# Patient Record
Sex: MALE | Race: BLACK OR AFRICAN AMERICAN | Employment: UNEMPLOYED | URBAN - METROPOLITAN AREA
[De-identification: names, ages, dates, MRNs, and addresses within clinical notes are randomized per-mention and may not be internally consistent; named-entity substitution may affect disease eponyms.]

---

## 2017-07-26 ENCOUNTER — OFFICE VISIT (OUTPATIENT)
Dept: FAMILY MEDICINE CLINIC | Age: 27
End: 2017-07-26

## 2017-07-26 VITALS
WEIGHT: 188.6 LBS | RESPIRATION RATE: 16 BRPM | BODY MASS INDEX: 23.45 KG/M2 | TEMPERATURE: 98.3 F | SYSTOLIC BLOOD PRESSURE: 113 MMHG | OXYGEN SATURATION: 98 % | HEART RATE: 78 BPM | DIASTOLIC BLOOD PRESSURE: 69 MMHG | HEIGHT: 75 IN

## 2017-07-26 DIAGNOSIS — Z79.899 ENCOUNTER FOR LONG-TERM (CURRENT) USE OF HIGH-RISK MEDICATION: ICD-10-CM

## 2017-07-26 DIAGNOSIS — F31.75 BIPOLAR DISORDER, IN PARTIAL REMISSION, MOST RECENT EPISODE DEPRESSED (HCC): ICD-10-CM

## 2017-07-26 DIAGNOSIS — Z76.89 ESTABLISHING CARE WITH NEW DOCTOR, ENCOUNTER FOR: Primary | ICD-10-CM

## 2017-07-26 RX ORDER — DIVALPROEX SODIUM 500 MG/1
500 TABLET, DELAYED RELEASE ORAL 2 TIMES DAILY
Qty: 60 TAB | Refills: 1 | Status: SHIPPED | OUTPATIENT
Start: 2017-07-26 | End: 2017-11-09 | Stop reason: SDUPTHER

## 2017-07-26 RX ORDER — BENZTROPINE MESYLATE 2 MG/1
1 TABLET ORAL DAILY
Qty: 30 TAB | Refills: 1 | Status: SHIPPED | OUTPATIENT
Start: 2017-07-26 | End: 2017-11-09 | Stop reason: SDUPTHER

## 2017-07-26 RX ORDER — HALOPERIDOL 5 MG/1
5 TABLET ORAL 2 TIMES DAILY
Qty: 60 TAB | Refills: 1 | Status: SHIPPED | OUTPATIENT
Start: 2017-07-26 | End: 2017-11-09 | Stop reason: SDUPTHER

## 2017-07-26 NOTE — PROGRESS NOTES
Chief Complaint   Patient presents with   [facility] Establish Care     Establish care. Needs med refills.

## 2017-07-26 NOTE — MR AVS SNAPSHOT
Visit Information Date & Time Provider Department Dept. Phone Encounter #  
 7/26/2017 11:00 AM Dominik Butler MD [facility] at [address] [id number] Follow-up Instructions Return in about 4 weeks (around 8/23/2017) for bipolar, meds. Upcoming Health Maintenance Date Due INFLUENZA AGE 9 TO ADULT 8/1/2017 DTaP/Tdap/Td series (2 - Td) 2/6/2022 Allergies as of 7/26/2017  Review Complete On: 7/26/2017 By: Roderick Lopez LPN No Known Allergies Current Immunizations  Reviewed on 11/4/2015 Name Date Influenza Vaccine 3/22/2013 Influenza Vaccine (Quad) PF 11/4/2015 Influenza Vaccine PF 10/8/2013 PPD 2/6/2012 TB Skin Test (PPD) Intradermal 3/19/2014, 3/25/2013 TDAP Vaccine 2/6/2012 Not reviewed this visit You Were Diagnosed With   
  
 Codes Comments Establishing care with new doctor, encounter for    -  Primary ICD-10-CM: Z71.89 ICD-9-CM: V65.8 Bipolar disorder, in partial remission, most recent episode depressed ([facility]     ICD-10-CM: F31.75 ICD-9-CM: 296.55 Encounter for long-term (current) use of high-risk medication     ICD-10-CM: Z79.899 ICD-9-CM: V58.69 Vitals BP Pulse Temp Resp Height(growth percentile) Weight(growth percentile) 113/69 (BP 1 Location: Right arm, BP Patient Position: Sitting) 78 98.3 °F (36.8 °C) (Oral) 16 6' 3\" (1.905 m) 188 lb 9.6 oz (85.5 kg) SpO2 BMI Smoking Status 98% 23.57 kg/m2 Never Smoker BMI and BSA Data Body Mass Index Body Surface Area  
 23.57 kg/m 2 2.13 m 2 Preferred Pharmacy Pharmacy Name Phone [facility], [address] [phone] Your Updated Medication List  
  
   
This list is accurate as of: 7/26/17 11:40 AM.  Always use your most recent med list.  
  
  
  
  
 benztropine 2 mg tablet Commonly known as:  COGENTIN  
 Take 0.5 Tabs by mouth daily. divalproex  mg tablet Commonly known as:  DEPAKOTE Take 1 Tab by mouth two (2) times a day.  
  
 haloperidol 5 mg tablet Commonly known as:  HALDOL Take 1 Tab by mouth two (2) times a day. 1 tab in AM and 2 tabs at bedtime Prescriptions Sent to Pharmacy Refills  
 divalproex DR (DEPAKOTE) 500 mg tablet 1 Sig: Take 1 Tab by mouth two (2) times a day. Class: Normal  
 Pharmacy: [address] Ph #: [phone] Route: Oral  
 haloperidol (HALDOL) 5 mg tablet 1 Sig: Take 1 Tab by mouth two (2) times a day. 1 tab in AM and 2 tabs at bedtime Class: Normal  
 Pharmacy: [address] Ph #: [phone] Route: Oral  
 benztropine (COGENTIN) 2 mg tablet 1 Sig: Take 0.5 Tabs by mouth daily. Class: Normal  
 Pharmacy: [address] Ph #: [phone] Route: Oral  
  
We Performed the Following CBC W/O DIFF [69781 CPT(R)] METABOLIC PANEL, COMPREHENSIVE [13314 CPT(R)] TSH RFX ON ABNORMAL TO FREE T4 [[id number] Custom] Follow-up Instructions Return in about 4 weeks (around 8/23/2017) for bipolar, meds. Introducing [facility] & HEALTH SERVICES! Roxanna Judge introduces [employer] patient portal. Now you can access parts of your medical record, email your doctor's office, and request medication refills online. 1. In your internet browser, go to [email or web address] 2. Click on the First Time User? Click Here link in the Sign In box. You will see the New Member Sign Up page. 3. Enter your [employer] Access Code exactly as it appears below. You will not need to use this code after youve completed the sign-up process.  If you do not sign up before the expiration date, you must request a new code. · [employer] Access Code: [email or web address] Expires: 10/24/2017 11:40 AM 
 
4. Enter the last four digits of your Social Security Number (xxxx) and Date of Birth (mm/dd/yyyy) as indicated and click Submit. You will be taken to the next sign-up page. 5. Create a [employer] ID. This will be your [employer] login ID and cannot be changed, so think of one that is secure and easy to remember. 6. Create a [employer] password. You can change your password at any time. 7. Enter your Password Reset Question and Answer. This can be used at a later time if you forget your password. 8. Enter your e-mail address. You will receive e-mail notification when new information is available in [address] 9. Click Sign Up. You can now view and download portions of your medical record. 10. Click the Download Summary menu link to download a portable copy of your medical information. If you have questions, please visit the Frequently Asked Questions section of the [employer] website. Remember, [employer] is NOT to be used for urgent needs. For medical emergencies, dial 911. Now available from your iPhone and Android! Please provide this summary of care documentation to your next provider. Your primary care clinician is listed as Phys Other. If you have any questions after today's visit, please call [phone].

## 2017-07-26 NOTE — PROGRESS NOTES
Gerhardt Burlington is a 33 y.o. male, who's a new patient to our practice. Previous PCP: Yue, last seen in 2015. Have moved recently from [facility]. Used to live with his adopted parent but no longer. Lives with his girlfriend and her mother. He has a , not present with him. 32yo AA M with a past medical history of Aggression (3/22/2013); Bipolar affective disorder ([facility] (3/22/2013); Bipolar disorder, in partial remission, most recent episode depressed ([facility] (7/26/2017); Heart murmur (11/23/2015); History of traumatic brain injury (11/23/2015)    Bipolar affective. Have been without his meds for 11 months now. His symptoms currently are mild mood swings. He was advice by his new GF family to sort out his medication issue. Hence being here for meds refill. He denies anxiety or depression. Denies Visual hallucination or auditory hallucination, SI or HI. He was previously on depakote, cogentin, haldo. We discussed just starting on depakote alone first and f/u for eval and see if there's need to restart on the rest.   Also we'll get medical record from [facility]. Reviewed: active problem list, medication list, allergies, family history, social history, notes from last encounter    A comprehensive review of systems was negative except for that written in the HPI. No Known Allergies  No current outpatient prescriptions on file prior to visit. No current facility-administered medications on file prior to visit.       Patient Active Problem List   Diagnosis Code    Encounter for PPD test Z11.1    Knee pain, left M25.562    Bipolar affective disorder ([facility] F31.9    Aggression R45.89    Heart murmur R01.1    History of traumatic brain injury Z87.820    Bipolar disorder, in partial remission, most [facility] episode [facility] F31.75       Visit Vitals    /69 (BP 1 Location: Right arm, BP Patient Position: Sitting)    Pulse 78    Temp 98.3 °F (36.8 °C) (Oral)    Resp 16    Ht 6' 3\" (1.905 m)    Wt 188 lb 9.6 oz (85.5 kg)    SpO2 98%    BMI 23.57 kg/m2     General appearance: alert, cooperative, no distress, appears stated age  Neurologic: Alert and oriented X 3, normal strength and tone, symmetric. Normal without focal findings. Cranial nerves 2-12 intact. Normal coordination and gait. Mental status: Alert, oriented, thought content appropriate, affect: stable, mood-congruent. Head: Normocephalic, without obvious abnormality, atraumatic  Eyes: conjunctivae/corneas clear. PERRL, EOM's intact. Neck: supple, symmetrical, trachea midline, no JVD  Lungs: clear to auscultation bilaterally  Heart: regular rate and rhythm, S1, S2 normal, no murmur, click, rub or gallop  Abdomen: soft, non-tender. Extremities: extremities normal, atraumatic, no cyanosis or edema      Assessment/Plans:    He was previously on depakote, cogentin, haldo. We discussed just starting on depakote alone first and f/u for eval and see if there's need to restart on the rest.   Also we'll get medical record from [facility]. Diagnoses and all orders for this visit:    1. Establishing care with new doctor, encounter for    2. Bipolar disorder, in partial remission, most recent episode depressed (HCC)  -     divalproex DR (DEPAKOTE) 500 mg tablet; Take 1 Tab by mouth two (2) times a day. -     haloperidol (HALDOL) 5 mg tablet; Take 1 Tab by mouth two (2) times a day. 1 tab in AM and 2 tabs at bedtime  -     benztropine (COGENTIN) 2 mg tablet; Take 0.5 Tabs by mouth daily.  -     CBC W/O DIFF  -     METABOLIC PANEL, COMPREHENSIVE  -     TSH RFX ON ABNORMAL TO FREE T4    3. Encounter for long-term (current) use of high-risk medication  -     CBC W/O DIFF  -     METABOLIC PANEL, COMPREHENSIVE  -     TSH RFX ON ABNORMAL TO FREE T4      Discussed plans, risk/benefits of treatments/observations. Through the use of shared decision making, above plans were agreed upon.    Medication compliance advised. Patient verbalized understanding. Follow-up Disposition:  Return in about 4 weeks (around 8/23/2017) for bipolar, meds.       Georgette Velazquez MD  7/26/2017

## 2017-07-27 LAB
ALBUMIN SERPL-MCNC: 4.8 G/DL (ref 3.5–5.5)
ALBUMIN/GLOB SERPL: 1.8 {RATIO} (ref 1.2–2.2)
ALP SERPL-CCNC: 61 IU/L (ref 39–117)
ALT SERPL-CCNC: 19 IU/L (ref 0–44)
AST SERPL-CCNC: 26 IU/L (ref 0–40)
BILIRUB SERPL-MCNC: 0.4 MG/DL (ref 0–1.2)
BUN SERPL-MCNC: 9 MG/DL (ref 6–20)
BUN/CREAT SERPL: 8 (ref 9–20)
CALCIUM SERPL-MCNC: 10.2 MG/DL (ref 8.7–10.2)
CHLORIDE SERPL-SCNC: 99 MMOL/L (ref 96–106)
CO2 SERPL-SCNC: 26 MMOL/L (ref 18–29)
CREAT SERPL-MCNC: 1.07 MG/DL (ref 0.76–1.27)
ERYTHROCYTE [DISTWIDTH] IN BLOOD BY AUTOMATED COUNT: 14.3 % (ref 12.3–15.4)
GLOBULIN SER CALC-MCNC: 2.6 G/DL (ref 1.5–4.5)
GLUCOSE SERPL-MCNC: 83 MG/DL (ref 65–99)
HCT VFR BLD AUTO: 47.7 % (ref 37.5–51)
HGB BLD-MCNC: 16 G/DL (ref 12.6–17.7)
MCH RBC QN AUTO: 28.6 PG (ref 26.6–33)
MCHC RBC AUTO-ENTMCNC: 33.5 G/DL (ref 31.5–35.7)
MCV RBC AUTO: 85 FL (ref 79–97)
PLATELET # BLD AUTO: 281 X10E3/UL (ref 150–379)
POTASSIUM SERPL-SCNC: 4.2 MMOL/L (ref 3.5–5.2)
PROT SERPL-MCNC: 7.4 G/DL (ref 6–8.5)
RBC # BLD AUTO: 5.6 X10E6/UL (ref 4.14–5.8)
SODIUM SERPL-SCNC: 140 MMOL/L (ref 134–144)
TSH SERPL DL<=0.005 MIU/L-ACNC: 1.66 UIU/ML (ref 0.45–4.5)
WBC # BLD AUTO: 6.6 X10E3/UL (ref 3.4–10.8)

## 2017-11-09 ENCOUNTER — OFFICE VISIT (OUTPATIENT)
Dept: FAMILY MEDICINE CLINIC | Age: 27
End: 2017-11-09

## 2017-11-09 VITALS
OXYGEN SATURATION: 96 % | RESPIRATION RATE: 16 BRPM | HEIGHT: 75 IN | BODY MASS INDEX: 24.89 KG/M2 | HEART RATE: 62 BPM | WEIGHT: 200.2 LBS | SYSTOLIC BLOOD PRESSURE: 138 MMHG | TEMPERATURE: 97.5 F | DIASTOLIC BLOOD PRESSURE: 81 MMHG

## 2017-11-09 DIAGNOSIS — Z23 ENCOUNTER FOR IMMUNIZATION: ICD-10-CM

## 2017-11-09 DIAGNOSIS — Z00.00 ANNUAL PHYSICAL EXAM: Primary | ICD-10-CM

## 2017-11-09 DIAGNOSIS — F31.75 BIPOLAR DISORDER, IN PARTIAL REMISSION, MOST RECENT EPISODE DEPRESSED (HCC): ICD-10-CM

## 2017-11-09 DIAGNOSIS — Z79.899 ENCOUNTER FOR LONG-TERM CURRENT USE OF HIGH RISK MEDICATION: ICD-10-CM

## 2017-11-09 RX ORDER — HALOPERIDOL 5 MG/1
5 TABLET ORAL 2 TIMES DAILY
Qty: 60 TAB | Refills: 2 | Status: SHIPPED | OUTPATIENT
Start: 2017-11-09 | End: 2019-01-13

## 2017-11-09 RX ORDER — BENZTROPINE MESYLATE 2 MG/1
1 TABLET ORAL DAILY
Qty: 30 TAB | Refills: 2 | Status: SHIPPED | OUTPATIENT
Start: 2017-11-09 | End: 2019-01-13

## 2017-11-09 RX ORDER — DIVALPROEX SODIUM 250 MG/1
250 TABLET, DELAYED RELEASE ORAL 2 TIMES DAILY
Qty: 60 TAB | Refills: 2 | Status: SHIPPED | OUTPATIENT
Start: 2017-11-09 | End: 2019-01-13

## 2017-11-09 NOTE — PROGRESS NOTES
Chief Complaint   Patient presents with    Medication Management     Patient her for medication management. Patient concerned how drowsy the Depakote makes him feel. Patient states he used to take Haldol injection in addition to oral medication. Patient concerned if he's on correct regimen.

## 2017-11-09 NOTE — PROGRESS NOTES
Venus Waller is a 33 y.o. male,     2nd visit with this physician. He didn't f/u as discussed after last visit. It's now 4 months later. Used to live with his adopted parent but no longer. Lives with his girlfriend and her mother. He has a , not present with him. 32yo AA M with a past medical history of Aggression (3/22/2013); Bipolar affective disorder ([facility] (3/22/2013); Bipolar disorder, in partial remission, most recent episode depressed ([facility] (7/26/2017); Heart murmur (11/23/2015); History of traumatic brain injury (11/23/2015)    Bipolar affective. We have restarted his meds for 4 months now. He report currently doing well. His GF and her mother has no other concern at this time. He denies anxiety or depression. Denies Visual hallucination or auditory hallucination, SI or HI. He is on depakote, cogentin, haldo. The depakote at 500mg BID makes him very sleepy, he would fall a sleep for about 2 hours in the day after taking it. He have stop taking that in the morning and only takes that at night. We discussed option, we can try to decrease dose to 250mg BID. If still cause drowsiness it's OK to take QHS. We'll also refer him to Psych. Otherwise denies CP, SOB, [place], abnormal tic, or mood or psychotic episodes. Reviewed: active problem list, medication list, allergies, family history, social history, notes from last encounter    A comprehensive review of systems was negative except for that written in the HPI. No Known Allergies  No current outpatient prescriptions on file prior to visit. No current facility-administered medications on file prior to visit.       Patient Active Problem List   Diagnosis Code    Encounter for PPD test Z11.1    Knee pain, left M25.562    Bipolar affective disorder ([facility] F31.9    Aggression R45.89    Heart murmur R01.1    History of traumatic brain injury Z87.820    Bipolar disorder, in partial remission, most recent episode depressed ([facility] F31.75       Visit Vitals    /81 (BP 1 Location: Right arm, BP Patient Position: Sitting)    Pulse 62    Temp 97.5 °F (36.4 °C) (Oral)    Resp 16    Ht 6' 3\" (1.905 m)    Wt 200 lb 3.2 oz (90.8 kg)    SpO2 96%    BMI 25.02 kg/m2     General appearance: alert, cooperative, no distress, appears stated age  Neurologic: Alert and oriented X 3, normal strength and tone, symmetric. Normal without focal findings. Cranial nerves 2-12 intact. Normal coordination and gait. Mental status: Alert, oriented, thought content appropriate, affect: stable, mood-congruent. Head: Normocephalic, without obvious abnormality, atraumatic  Eyes: conjunctivae/corneas clear. PERRL, EOM's intact. Neck: supple, symmetrical, trachea midline, no JVD  Lungs: clear to auscultation bilaterally  Heart: regular rate and rhythm, S1, S2 normal, no murmur, click, rub or gallop  Abdomen: soft, non-tender. Extremities: extremities normal, atraumatic, no cyanosis or edema      Assessment/Plans:    Diagnoses and all orders for this visit:    1. Annual physical exam  -     CBC W/O DIFF  -     HEMOGLOBIN A1C W/O EAG  -     LIPID PANEL  -     METABOLIC PANEL, COMPREHENSIVE  -     TSH RFX ON ABNORMAL TO FREE T4  -     URINALYSIS W/ RFLX MICROSCOPIC  -     VITAMIN D, 25 HYDROXY  -     HIV 1/2 AG/AB, 4TH GENERATION,W RFLX CONFIRM    2. Bipolar disorder, in partial remission, most recent episode depressed ([facility]  -     [facility]  -     divalproex DR (DEPAKOTE) 250 mg tablet; Take 1 Tab by mouth two (2) times a day. -     haloperidol (HALDOL) 5 mg tablet; Take 1 Tab by mouth two (2) times a day. 1 tab in AM and 2 tabs at bedtime  -     benztropine (COGENTIN) 2 mg tablet; Take 0.5 Tabs by mouth daily.  -     CBC W/O DIFF  -     METABOLIC PANEL, COMPREHENSIVE    3.  Encounter for long-term current use of high risk medication  -     CBC W/O DIFF  -     HEMOGLOBIN A1C W/O EAG  -     LIPID PANEL  -     METABOLIC PANEL, COMPREHENSIVE  -     TSH RFX ON ABNORMAL TO FREE T4  -     URINALYSIS W/ RFLX MICROSCOPIC  -     VITAMIN D, 25 HYDROXY  -     HIV 1/2 AG/AB, 4TH GENERATION,W RFLX CONFIRM    4. Encounter for immunization  -     TX IMMUNIZ ADMIN,1 SINGLE/COMB VAC/TOXOID  -     Influenza virus vaccine (QUADRIVALENT PRES FREE SYRINGE) IM (17030)      Discussed plans, risk/benefits of treatments/observations. Through the use of shared decision making, above plans were agreed upon. Medication compliance advised. Patient verbalized understanding.      Follow-up Disposition:  Return in about 3 months (around 2/9/2018) for meds, labs monitoring, sooner if labs abnormal.      Martha Pantoja MD  11/9/2017

## 2017-11-09 NOTE — MR AVS SNAPSHOT
Visit Information Date & Time Provider Department Dept. Phone Encounter #  
 11/9/2017 10:00 AM Lobo Hansen MD [facility] at [address] [id number] Follow-up Instructions Return in about 3 months (around 2/9/2018) for meds, labs monitoring, sooner if labs abnormal. Upcoming Health Maintenance Date Due Influenza Age 5 to Adult 8/1/2017 DTaP/Tdap/Td series (2 - Td) 2/6/2022 Allergies as of 11/9/2017  Review Complete On: 11/9/2017 By: Lobo Hansen MD  
 No Known Allergies Current Immunizations  Reviewed on 11/4/2015 Name Date Influenza Vaccine 3/22/2013 Influenza Vaccine (Quad) PF  Incomplete, 11/4/2015 Influenza Vaccine PF 10/8/2013 PPD 2/6/2012 TB Skin Test (PPD) Intradermal 3/19/2014, 3/25/2013 TDAP Vaccine 2/6/2012 Not reviewed this visit You Were Diagnosed With   
  
 Codes Comments Annual physical exam    -  Primary ICD-10-CM: Z00.00 ICD-9-CM: V70.0 Bipolar disorder, in partial remission, most recent episode depressed ([facility]     ICD-10-CM: F31.75 ICD-9-CM: 296.55 Encounter for long-term current use of high risk medication     ICD-10-CM: Z79.899 ICD-9-CM: V58.69 Encounter for immunization     ICD-10-CM: P48 ICD-9-CM: V03.89 Vitals BP Pulse Temp Resp Height(growth percentile) Weight(growth percentile) 138/81 (BP 1 Location: Right arm, BP Patient Position: Sitting) 62 97.5 °F (36.4 °C) (Oral) 16 6' 3\" (1.905 m) 200 lb 3.2 oz (90.8 kg) SpO2 BMI Smoking Status 96% 25.02 kg/m2 Never Smoker Vitals History BMI and BSA Data Body Mass Index Body Surface Area 25.02 kg/m 2 2.19 m 2 Preferred Pharmacy Pharmacy Name Phone [address], [address] [phone] Your Updated Medication List  
  
   
This list is accurate as of: 11/9/17 10:46 AM.  Always use your most recent med list.  
  
  
  
  
 benztropine 2 mg tablet Commonly known as:  COGENTIN Take 0.5 Tabs by mouth daily. divalproex  mg tablet Commonly known as:  DEPAKOTE Take 1 Tab by mouth two (2) times a day.  
  
 haloperidol 5 mg tablet Commonly known as:  HALDOL Take 1 Tab by mouth two (2) times a day. 1 tab in AM and 2 tabs at bedtime Prescriptions Sent to Pharmacy Refills  
 divalproex DR (DEPAKOTE) 250 mg tablet 2 Sig: Take 1 Tab by mouth two (2) times a day. Class: Normal  
 Pharmacy: [address] Ph #: [phone] Route: Oral  
 haloperidol (HALDOL) 5 mg tablet 2 Sig: Take 1 Tab by mouth two (2) times a day. 1 tab in AM and 2 tabs at bedtime Class: Normal  
 Pharmacy: [address] Ph #: [phone] Route: Oral  
 benztropine (COGENTIN) 2 mg tablet 2 Sig: Take 0.5 Tabs by mouth daily. Class: Normal  
 Pharmacy: [address] Ph #: [phone] Route: Oral  
  
We Performed the Following CBC W/O DIFF [97129 CPT(R)] HEMOGLOBIN A1C W/O EAG [94137 CPT(R)] HIV 1/2 AG/AB, 4TH GENERATION,W RFLX CONFIRM [id number] CPT(R)] INFLUENZA VIRUS VAC QUAD,SPLIT,PRESV FREE SYRINGE IM [id number] CPT(R)] LIPID PANEL [27206 CPT(R)] METABOLIC PANEL, COMPREHENSIVE [61136 CPT(R)] DE IMMUNIZ ADMIN,1 SINGLE/COMB VAC/TOXOID [id number] CPT(R)] REFERRAL TO PSYCHIATRY [REF91 Custom] Comments:  
 bipolar TSH RFX ON ABNORMAL TO FREE T4 [[id number] Custom] URINALYSIS W/ RFLX MICROSCOPIC [54039 CPT(R)] VITAMIN D, 25 HYDROXY [id number] CPT(R)] Follow-up Instructions  Return in about 3 months (around 2/9/2018) for meds, labs monitoring, sooner if labs abnormal.  
  
 Referral Information Referral ID Referred By Referred To  
  
 [id number] Jolly Goodson MD   
   [facility] [address] [place], [address] [facility] Phone: [phone] Fax: [phone] Visits Status Start Date End Date 1 New Request 11/9/17 11/9/18 If your referral has a status of pending review or denied, additional information will be sent to support the outcome of this decision. Introducing [facility] & [facility]! Adal Aguilera introduces [employer] patient portal. Now you can access parts of your medical record, email your doctor's office, and request medication refills online. 1. In your internet browser, go to [email or web address] 2. Click on the First Time User? Click Here link in the Sign In box. You will see the New Member Sign Up page. 3. Enter your [employer] Access Code exactly as it appears below. You will not need to use this code after youve completed the sign-up process. If you do not sign up before the expiration date, you must request a new code. · [employer] Access Code: [email or web address] Expires: 2/7/2018 10:46 AM 
 
4. Enter the last four digits of your Social Security Number (xxxx) and Date of Birth (mm/dd/yyyy) as indicated and click Submit. You will be taken to the next sign-up page. 5. Create a [employer] ID. This will be your [employer] login ID and cannot be changed, so think of one that is secure and easy to remember. 6. Create a [employer] password. You can change your password at any time. 7. Enter your Password Reset Question and Answer. This can be used at a later time if you forget your password. 8. Enter your e-mail address. You will receive e-mail notification when new information is available in [address] 9. Click Sign Up. You can now view and download portions of your medical record. 10. Click the Download Summary menu link to download a portable copy of your medical information. If you have questions, please visit the Frequently Asked Questions section of the [employer] website. Remember, [employer] is NOT to be used for urgent needs. For medical emergencies, dial 911. Now available from your iPhone and Android! Please provide this summary of care documentation to your next provider. Your primary care clinician is listed as Phys Other. If you have any questions after today's visit, please call [phone].

## 2017-11-10 LAB
25(OH)D3+25(OH)D2 SERPL-MCNC: 18.3 NG/ML (ref 30–100)
ALBUMIN SERPL-MCNC: 4.2 G/DL (ref 3.5–5.5)
ALBUMIN/GLOB SERPL: 1.8 {RATIO} (ref 1.2–2.2)
ALP SERPL-CCNC: 66 IU/L (ref 39–117)
ALT SERPL-CCNC: 36 IU/L (ref 0–44)
APPEARANCE UR: CLEAR
AST SERPL-CCNC: 21 IU/L (ref 0–40)
BACTERIA #/AREA URNS HPF: ABNORMAL /[HPF]
BILIRUB SERPL-MCNC: 0.7 MG/DL (ref 0–1.2)
BILIRUB UR QL STRIP: NEGATIVE
BUN SERPL-MCNC: 10 MG/DL (ref 6–20)
BUN/CREAT SERPL: 10 (ref 9–20)
CALCIUM SERPL-MCNC: 9.3 MG/DL (ref 8.7–10.2)
CASTS URNS QL MICRO: ABNORMAL /LPF
CHLORIDE SERPL-SCNC: 99 MMOL/L (ref 96–106)
CHOLEST SERPL-MCNC: 152 MG/DL (ref 100–199)
CO2 SERPL-SCNC: 25 MMOL/L (ref 18–29)
COLOR UR: YELLOW
CREAT SERPL-MCNC: 1.02 MG/DL (ref 0.76–1.27)
EPI CELLS #/AREA URNS HPF: ABNORMAL /HPF
ERYTHROCYTE [DISTWIDTH] IN BLOOD BY AUTOMATED COUNT: 13.6 % (ref 12.3–15.4)
GFR SERPLBLD CREATININE-BSD FMLA CKD-EPI: 101 ML/MIN/1.73
GFR SERPLBLD CREATININE-BSD FMLA CKD-EPI: 117 ML/MIN/1.73
GLOBULIN SER CALC-MCNC: 2.3 G/DL (ref 1.5–4.5)
GLUCOSE SERPL-MCNC: 90 MG/DL (ref 65–99)
GLUCOSE UR QL: NEGATIVE
HBA1C MFR BLD: 5.1 % (ref 4.8–5.6)
HCT VFR BLD AUTO: 47.1 % (ref 37.5–51)
HDLC SERPL-MCNC: 60 MG/DL
HGB BLD-MCNC: 16.1 G/DL (ref 12.6–17.7)
HGB UR QL STRIP: NEGATIVE
HIV 1+2 AB+HIV1 P24 AG SERPL QL IA: NON REACTIVE
KETONES UR QL STRIP: NEGATIVE
LDLC SERPL CALC-MCNC: 72 MG/DL (ref 0–99)
LEUKOCYTE ESTERASE UR QL STRIP: ABNORMAL
MCH RBC QN AUTO: 29.9 PG (ref 26.6–33)
MCHC RBC AUTO-ENTMCNC: 34.2 G/DL (ref 31.5–35.7)
MCV RBC AUTO: 88 FL (ref 79–97)
MICRO URNS: ABNORMAL
MUCOUS THREADS URNS QL MICRO: PRESENT
NITRITE UR QL STRIP: NEGATIVE
PH UR STRIP: 6.5 [PH] (ref 5–7.5)
PLATELET # BLD AUTO: 264 X10E3/UL (ref 150–379)
POTASSIUM SERPL-SCNC: 4.1 MMOL/L (ref 3.5–5.2)
PROT SERPL-MCNC: 6.5 G/DL (ref 6–8.5)
PROT UR QL STRIP: NEGATIVE
RBC # BLD AUTO: 5.38 X10E6/UL (ref 4.14–5.8)
RBC #/AREA URNS HPF: ABNORMAL /HPF
SODIUM SERPL-SCNC: 141 MMOL/L (ref 134–144)
SP GR UR: 1.02 (ref 1–1.03)
TRIGL SERPL-MCNC: 101 MG/DL (ref 0–149)
TSH SERPL DL<=0.005 MIU/L-ACNC: 1.13 UIU/ML (ref 0.45–4.5)
UROBILINOGEN UR STRIP-MCNC: 0.2 MG/DL (ref 0.2–1)
VLDLC SERPL CALC-MCNC: 20 MG/DL (ref 5–40)
WBC # BLD AUTO: 6.9 X10E3/UL (ref 3.4–10.8)
WBC #/AREA URNS HPF: ABNORMAL /HPF

## 2017-11-20 DIAGNOSIS — E55.9 VITAMIN D DEFICIENCY: Primary | ICD-10-CM

## 2017-11-20 RX ORDER — ERGOCALCIFEROL 1.25 MG/1
50000 CAPSULE ORAL
Qty: 12 CAP | Refills: 1 | Status: SHIPPED | OUTPATIENT
Start: 2017-11-20 | End: 2021-04-23

## 2018-05-09 ENCOUNTER — HOSPITAL ENCOUNTER (EMERGENCY)
Age: 28
Discharge: HOME | End: 2018-05-09
Attending: EMERGENCY MEDICINE
Payer: COMMERCIAL

## 2018-05-09 DIAGNOSIS — F31.9 BIPOLAR AFFECTIVE DISORDER, REMISSION STATUS UNSPECIFIED (HCC): Primary | ICD-10-CM

## 2018-05-09 PROCEDURE — 99283 EMERGENCY DEPT VISIT LOW MDM: CPT

## 2018-05-09 PROCEDURE — 90791 PSYCH DIAGNOSTIC EVALUATION: CPT

## 2018-05-09 NOTE — ED PROVIDER NOTES
HPI     Past Medical History:   Diagnosis Date    Aggression 3/22/2013    Bipolar affective disorder ([facility] 3/22/2013    Bipolar disorder, in partial remission, most recent episode depressed ([facility] 7/26/2017    Heart murmur 11/23/2015    History of traumatic brain injury 11/23/2015    Ill-defined condition     2012 assisted living     Psychiatric disorder     anxiety,bipolar    Psychotic disorder     Trauma     head injury as child    Vitamin D deficiency 11/20/2017       Past Surgical History:   Procedure Laterality Date    MA COLONOSCOPY FLX DX W/COLLJ SPEC WHEN PFRMD  6/13/2014              No family history on file. Social History     Social History    Marital status: SINGLE     Spouse name: N/A    Number of children: 0    Years of education: N/A     Occupational History          day program, light work      Social History Main Topics    Smoking status: Never Smoker    Smokeless tobacco: Never Used    Alcohol use No    Drug use: No      Comment: denies     Sexual activity: Yes     Partners: Female     Birth control/ protection: Condom     Other Topics Concern    Back Care Yes    Exercise Yes    Seat Belt Yes     Social History Narrative         ALLERGIES: Review of patient's allergies indicates no known allergies. Review of Systems    There were no vitals filed for this visit. Physical Exam     MDM  Number of Diagnoses or Management Options        ED Course       Procedures               32 yo AAM with medical hx remarkable for bipolar disorder and ADHD presenting via EMS with complaint of I feel exhausted. Reports residing with girlfriend and tonight got into a verbal disagreement and left and has been walking around [place] for past 2 hrs. Has family in [place]. Reports being prescribed Depakote but has not been taking. Denies SI, HI, auditory or visual hallucinations. Prior hx of inpatient psychiatric hospitalization about 1 year ago per patient.  Denies any substance abuse. No fever, headache, chest pain, SOB, abdominal pain, nausea, vomiting, diarrhea, constipation or urinary complaint. Requesting some help with how to cope. ROS  Const - Fever, chills, diaphoresis  HEENT- visual disturbance, sneezing, sore throat, ear pain, runny nose  card - CP,   Pulm -SOB, wheezing  Abd- abdominal pain, nausea, vomiting, diarrhea, constipation   -urinary frequency, hematuria, urinary retention  Psych- anxiety, - depression, -insomnia, - HI, -SI  Vital signs  Temp 98.3 F, /83, Pulse 88, Resp 16 SpO2 97%  WDWN AAM in NAD  Head: NC/AT  ENT: external ear nml, canal clear,  normal, normal nose, oropharynx clear without erythema or edema, midline uvula, good dentition  Card RRR without MRG  Lungs CTA throughout without adventitious sound  Abdomen: normal appearing, soft, non-tender, no palpable organomegaly or mass  MSK: moving all extremities  Neuro: A and O x 4, CN grossly intact  Psych: Tangential speech with odd affect, no SI or HI  32 yo AAm with hx of bipolar disorder presenting for evaluation. Appears slightly manic but stable. Admitted non-compliance with medication  Plan  [employer] consult  Progress note  Pt seen and evaluated by Song Mcknight, [facility] counselor. Pt provided with resources. Has follow-up in community. Mary Gomezma    A/P  Bipolar disorder: Please follow-up with resources given. Return for any new or worsening.  Niesha Tompkins PA-C

## 2018-05-09 NOTE — ED NOTES
The documentation for this period is being entered following the guidelines as defined in the [facility] downtime policy by Esthela Mortensen.

## 2018-05-09 NOTE — BSMART NOTE
Comprehensive Assessment Form Part 1      Section I - Disposition    Axis I - Relational problem with girlfriend VS Substance Induce mood d/o (noncompliant with medications)      Bipolar disorders bipolar I most recent episode manic noncompliance with treatment by hx  Axis II - diagnosis deferred  Axis III - None  Axis IV - Problems with primary support group, problems related to the social environment, occupational problems, and housing problems  [place] V - 60      The Medical Doctor to Psychiatrist conference was not completed. Medical doctor is in agreement with psychiatrist disposition because this counselor conveyed to ED physician the recommendation of the on-call psychiatrist and they concurred. Plan is to discharge home and follow up with [facility]/Ms. Marvin tomorrow. The on-call Psychiatrist consulted was Dr. Jaqui Abdalla. The admitting Psychiatrist will be Dr. Ben Avelar. The admitting Diagnosis is n/a. The Payor source is [address] [place] [address] Section II - Integrated Summary  Summary:    Patient is a 34 yo black male who arrives at ED with chief complaint of I got into an argument with my girlfriend and got upset. She broke my cell phone. I thought I would come here to settle down.    Patient currently lives with girlfriend and her mother. Patient adamantly denies suicidal ideation, denies homicidal ideation, denies auditory and visual hallucinations, is not delusional, and is oriented X4. Patient reports no previous suicide attempts saying, I love my life to do anything like that.  He is followed by [facility] with Ms. Laney Rollins as his . Patient receives SSI/disability but states he is in the process of looking for a job so he can move out of girlfriends house. 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 Patient also reports he stopped taking his Depakote about 1 month ago and feels this may be exacerbating his anxiety.  Patient reports he was abandoned as a child and went into foster care and then was adopted. He is forward thinking as evidenced by statements above and spent 4 hours today playing basketball.   He tried to contact his grandmother/Alexa Austin to see if he could stay with her tonight but was unable to reach her. He does not mind [facility] and following up with his /Ms Wong at [facility] [place] in the morning. Plan is to discharge home and follow up with [facility]/Ms. Wogn tomorrow. The patient has demonstrated mental capacity to provide informed consent. The information is given by the patient and past medical records. The Chief Complaint is argument with girlfriend. The Precipitant Factors are argument with girlfriend and noncompliance with Rx meds. Previous Hospitalizations: 4-5 at [facility] as a child for ADHD and Bipolar  The patient has not previously been in restraints. Current Psychiatrist and/or  is [facility] [place] [place]. Lethality Assessment:    The potential for suicide noted by the following: not noted. The potential for homicide is not noted. The patient has not been a perpetrator of sexual or physical abuse. There are not pending charges. The patient is not felt to be at risk for self harm or harm to others. The attending nurse was advised no further monitoring is necessary at this time. Section III - Psychosocial  The patient's overall mood and attitude is frustrated. Feelings of helplessness and hopelessness are not observed. Generalized anxiety is not observed. Panic is not observed. Phobias are not observed. Obsessive compulsive tendencies are not observed. Section IV - Mental Status Exam  The patient's appearance shows no evidence of impairment. The patient's behavior shows no evidence of impairment. The patient is oriented to time, place, person and situation. The patient's speech shows no evidence of impairment. The patient's mood is frustrated.   The range of affect shows no evidence of impairment. The patient's thought content demonstrates no evidence of impairment. The thought process shows no evidence of impairment. The patient's perception shows no evidence of impairment. The patient's memory shows no evidence of impairment. The patient's appetite shows no evidence of impairment. The patient's sleep shows no evidence of impairment. The patient's insight is blaming. The patient's judgement shows no evidence of impairment. Section V - Substance Abuse  The patient is not using substances. Section VI - Living Arrangements  The patient is single. The patient lives with a significant other. The patient has no children. The patient does plan to return home upon discharge. The patient does not have legal issues pending. The patient's source of income comes from disability and social security. Druze and cultural practices have not been voiced at this time. The patient's greatest support comes from [facility] and this person will be involved with the treatment. The patient has not been in an event described as horrible or outside the realm of ordinary life experience either currently or in the past.  The patient has not been a victim of sexual/physical abuse. Section VII - Other Areas of Clinical Concern  The highest grade achieved is 12th/tech school with the overall quality of school experience being described as good. The patient is currently disabled and speaks [place] as a primary language. The patient has no communication impairments affecting communication. The patient's preference for learning can be described as: learns best by oral information.   The patient's hearing is normal.  The patient's vision is normal.      Fatuma Zelaya, LPC

## 2019-01-13 ENCOUNTER — HOSPITAL ENCOUNTER (EMERGENCY)
Age: 29
Discharge: HOME | End: 2019-01-13
Attending: STUDENT IN AN ORGANIZED HEALTH CARE EDUCATION/TRAINING PROGRAM
Payer: COMMERCIAL

## 2019-01-13 VITALS
WEIGHT: 202.16 LBS | SYSTOLIC BLOOD PRESSURE: 145 MMHG | BODY MASS INDEX: 24.62 KG/M2 | HEART RATE: 84 BPM | HEIGHT: 76 IN | DIASTOLIC BLOOD PRESSURE: 76 MMHG | TEMPERATURE: 98 F | RESPIRATION RATE: 18 BRPM | OXYGEN SATURATION: 99 %

## 2019-01-13 DIAGNOSIS — Z20.2 POTENTIAL EXPOSURE TO STD: Primary | ICD-10-CM

## 2019-01-13 PROCEDURE — 74011250637 HC RX REV CODE- 250/637: Performed by: STUDENT IN AN ORGANIZED HEALTH CARE EDUCATION/TRAINING PROGRAM

## 2019-01-13 PROCEDURE — 74011250636 HC RX REV CODE- 250/636: Performed by: STUDENT IN AN ORGANIZED HEALTH CARE EDUCATION/TRAINING PROGRAM

## 2019-01-13 PROCEDURE — 87491 CHLMYD TRACH DNA AMP PROBE: CPT

## 2019-01-13 PROCEDURE — 99284 EMERGENCY DEPT VISIT MOD MDM: CPT

## 2019-01-13 PROCEDURE — 96372 THER/PROPH/DIAG INJ SC/IM: CPT

## 2019-01-13 RX ORDER — BENZTROPINE MESYLATE 0.5 MG/1
0.5 TABLET ORAL 2 TIMES DAILY
COMMUNITY
End: 2021-04-23

## 2019-01-13 RX ORDER — DIVALPROEX SODIUM 250 MG/1
250 TABLET, DELAYED RELEASE ORAL 2 TIMES DAILY
COMMUNITY
End: 2021-04-23

## 2019-01-13 RX ORDER — AZITHROMYCIN 250 MG/1
1000 TABLET, FILM COATED ORAL
Status: COMPLETED | OUTPATIENT
Start: 2019-01-13 | End: 2019-01-13

## 2019-01-13 RX ADMIN — LIDOCAINE HYDROCHLORIDE 250 MG: 10 INJECTION, SOLUTION EPIDURAL; INFILTRATION; INTRACAUDAL; PERINEURAL at 12:53

## 2019-01-13 RX ADMIN — AZITHROMYCIN 1000 MG: 250 TABLET, FILM COATED ORAL at 12:53

## 2019-01-13 NOTE — ED TRIAGE NOTES
Triage Note: Patient arrives by EMS for an STD check. Denies urinary symptoms. Patient reports a \"bump\" to left groin.

## 2019-01-13 NOTE — ED PROVIDER NOTES
34 yo M with PMH of bipolar d/o presents by EMS to [facility] with a CC of \"I'm worried I caught an STD. \"  Pt had unprotected intercourse with a new partner recently and two days ago noticed clear drainage from his penis with mild \"tingling\" with urination. He denies ulcer or sore. No hematuria. No PMH of HIV. Requesting testing and empiric treatment. The history is provided by the patient. Other This is a new problem. The current episode started 2 days ago. The problem occurs constantly. The problem has not changed since onset. Pertinent negatives include no abdominal pain. Exacerbated by: washing groin. Nothing relieves the symptoms. Treatments tried: washing groin. The treatment provided no relief. Past Medical History:  
Diagnosis Date  Aggression 3/22/2013  Bipolar affective disorder ([facility] 3/22/2013  Bipolar disorder, in partial remission, most recent episode depressed ([facility] 7/26/2017  Heart murmur 11/23/2015  History of traumatic brain injury 11/23/2015  Ill-defined condition 2012 assisted living  Psychiatric disorder   
 anxiety,bipolar  Psychotic disorder ([facility]  Trauma   
 head injury as child  Vitamin D deficiency 11/20/2017 Past Surgical History:  
Procedure Laterality Date  [place] COLONOSCOPY FLX DX W/COLLJ SPEC WHEN PFRMD  6/13/2014 History reviewed. No pertinent family history. Social History Socioeconomic History  Marital status: SINGLE Spouse name: Not on file  Number of children: 0  
 Years of education: Not on file  Highest education level: Not on file Social Needs  Financial resource strain: Not on file  Food insecurity - worry: Not on file  Food insecurity - inability: Not on file  Transportation needs - medical: Not on file  Transportation needs - non-medical: Not on file Occupational History Comment: day program, light work Tobacco Use  Smoking status: Current Some Day Smoker  Smokeless tobacco: Never Used Substance and Sexual Activity  Alcohol use: No  
  Alcohol/week: 0.0 oz  Drug use: No  
  Comment: denies  Sexual activity: Yes  
  Partners: Female Birth control/protection: Condom Other Topics Concern [address] Service Not Asked  Blood Transfusions Not Asked  Caffeine Concern Not Asked  Occupational Exposure Not Asked Kathye Elliott Hazards Not Asked  Sleep Concern Not Asked  Stress Concern Not Asked  Weight Concern Not Asked  Special Diet Not Asked  Back Care Yes  Exercise Yes  Bike Helmet Not Asked [address] Yes  Self-Exams Not Asked Social History Narrative  Not on file ALLERGIES: Patient has no known allergies. Review of Systems Constitutional: Negative for fever. Gastrointestinal: Negative for abdominal pain and vomiting. Genitourinary: Positive for discharge. Negative for genital sores, hematuria, penile swelling and testicular pain. Skin: Negative for rash and wound. All other systems reviewed and are negative. Vitals:  
 01/13/19 1209 BP: 151/88 Pulse: 79 Resp: 18 Temp: 98.3 °F (36.8 °C) SpO2: 98% Weight: 91.7 kg (202 lb 2.6 oz) Height: 6' 4\" (1.93 m) Physical Exam  
Constitutional: He is oriented to person, place, and time. He appears well-developed. No distress. HENT:  
Head: Normocephalic and atraumatic. Eyes: Conjunctivae and EOM are normal.  
Neck: Normal range of motion. Neck supple. Cardiovascular: Normal rate, regular rhythm and normal heart sounds. Pulmonary/Chest: Effort normal and breath sounds normal. No respiratory distress. Abdominal: Soft. There is no tenderness. There is no guarding. Hernia confirmed negative in the right inguinal area and confirmed negative in the left inguinal area. Genitourinary: Testes normal and penis normal. Right testis shows no tenderness. Left testis shows no tenderness. Circumcised.  No penile erythema or penile tenderness. Musculoskeletal: Normal range of motion. He exhibits no edema. Lymphadenopathy: No inguinal adenopathy noted on the right or left side. Neurological: He is alert and oriented to person, place, and time. He exhibits normal muscle tone. Skin: Skin is warm and dry. Vitals reviewed. MDM Procedures

## 2019-01-13 NOTE — DISCHARGE INSTRUCTIONS
Patient Education        Exposure to Sexually Transmitted Infections: Care Instructions  Your Care Instructions  Sexually transmitted infections (STIs) are those diseases spread by sexual contact. There are at least 20 different STIs, including chlamydia, gonorrhea, syphilis, and human immunodeficiency virus (HIV), which causes AIDS. Bacteria-caused STIs can be treated and cured. STIs caused by viruses, such as HIV, can be treated but not cured. Some STIs can reduce a woman's chances of getting pregnant in the future. STIs are spread during sexual contact, such as vaginal intercourse and oral or anal sex. Follow-up care is a key part of your treatment and safety. Be sure to make and go to all appointments, and call your doctor if you are having problems. It's also a good idea to know your test results and keep a list of the medicines you take. How can you care for yourself at home? · Your doctor may have given you a shot of antibiotics. If your doctor prescribed antibiotic pills, take them as directed. Do not stop taking them just because you feel better. You need to take the full course of antibiotics. · Do not have sexual contact while you have symptoms of an STI or are being treated for an STI. · Tell your sex partner (or partners) that he or she will need treatment. · If you are a woman, do not douche. Douching changes the normal balance of bacteria in the vagina and may spread an infection up into your reproductive organs. To prevent exposure to STIs in the future  · Use latex condoms every time you have sex. Use them from the beginning to the end of sexual contact. · Talk to your partner before you have sex. Find out if he or she has or is at risk for any STI. Keep in mind that a person may be able to spread an STI even if he or she does not have symptoms. · Do not have sex if you are being treated for an STI.   · Do not have sex with anyone who has symptoms of an STI, such as sores on the genitals or mouth.  · Having one sex partner (who does not have STIs and does not have sex with anyone else) is a good way to avoid STIs. When should you call for help? Call your doctor now or seek immediate medical care if:    · You have new pain in your belly or pelvis.     · You have symptoms of a urinary tract infection. These may include:  ? Pain or burning when you urinate. ? A frequent need to urinate without being able to pass much urine. ? Pain in the flank, which is just below the rib cage and above the waist on either side of the back. ? Blood in your urine. ? A fever.     · You have new or worsening pain or swelling in the scrotum.    Watch closely for changes in your health, and be sure to contact your doctor if:    · You have unusual vaginal bleeding.     · You have a discharge from the vagina or penis.     · You have any new symptoms, such as sores, bumps, rashes, blisters, or warts.     · You have itching, tingling, pain, or burning in the genital or anal area.     · You think you may have an STI. Where can you learn more? Go to http://elizabeth-rabia.info/. Enter C929 in the search box to learn more about \"Exposure to Sexually Transmitted Infections: Care Instructions. \"  Current as of: November 27, 2017  Content Version: 11.8  © 9906-9347 [employer]. Care instructions adapted under license by [employer] (which disclaims liability or warranty for this information). If you have questions about a medical condition or this instruction, always ask your healthcare professional. [place] [address] any warranty or liability for your use of this information.

## 2019-01-14 LAB
C TRACH DNA SPEC QL NAA+PROBE: POSITIVE
N GONORRHOEA DNA SPEC QL NAA+PROBE: NEGATIVE
SAMPLE TYPE: ABNORMAL
SERVICE CMNT-IMP: ABNORMAL
SPECIMEN SOURCE: ABNORMAL

## 2020-09-15 ENCOUNTER — HOSPITAL ENCOUNTER (EMERGENCY)
Age: 30
Discharge: HOME | End: 2020-09-15
Attending: EMERGENCY MEDICINE
Payer: COMMERCIAL

## 2020-09-15 VITALS
BODY MASS INDEX: 24.06 KG/M2 | HEART RATE: 78 BPM | TEMPERATURE: 98.3 F | RESPIRATION RATE: 18 BRPM | SYSTOLIC BLOOD PRESSURE: 136 MMHG | DIASTOLIC BLOOD PRESSURE: 90 MMHG | OXYGEN SATURATION: 100 % | HEIGHT: 75 IN | WEIGHT: 193.5 LBS

## 2020-09-15 DIAGNOSIS — Z91.14 NONCOMPLIANCE WITH MEDICATION REGIMEN: Primary | ICD-10-CM

## 2020-09-15 DIAGNOSIS — Z13.9 ENCOUNTER FOR MEDICAL SCREENING EXAMINATION: ICD-10-CM

## 2020-09-15 PROCEDURE — 99282 EMERGENCY DEPT VISIT SF MDM: CPT

## 2020-09-15 NOTE — ED PROVIDER NOTES
EMERGENCY DEPARTMENT HISTORY AND PHYSICAL EXAM      Date: 9/15/2020  Patient Name: Jose Gracia    History of Presenting Illness     Chief Complaint   Patient presents with    Mental Health Problem     History Provided By: Patient and Law Enforcement    HPI: Jose Gracia, 35 y.o. male with past medical history significant for bipolar disorder, aggression, and anxiety who presents in police custody under an ECO to the ED with cc of mental health evaluation. Patient states he was seen by Dianne Cobb today and felt disrespected so he became frustrated and stopped answering their questions. Prior to this, they called patient and he was somewhat elusive on the phone and would not confirm that he was not homicidal so they did a home visit. Patient states he was getting ready to play basketball with his friends and the behavioral health counselor that was seeing him was discussing his medications and personal history mildly in front of bystanders and this is what caused him to get agitated. Patient currently denies any homicidal or suicidal ideations. He does report that he is supposed to be on trazodone which he does not take because it only makes him fall asleep and does not help with his behavioral health issues. Patient feels that he does not need to be here at this time. PMHx: Bipolar disorder, aggressive behavior, anxiety  Social Hx: Former smoker, denies alcohol use, denies illegal drug use    PCP: Franky Lockett MD    There are no other complaints, changes, or physical findings at this time. No current facility-administered medications on file prior to encounter. Current Outpatient Medications on File Prior to Encounter   Medication Sig Dispense Refill    benztropine (COGENTIN) 0.5 mg tablet Take 0.5 mg by mouth two (2) times a day.  divalproex DR (DEPAKOTE) 250 mg tablet Take 250 mg by mouth two (2) times a day.       ergocalciferol (ERGOCALCIFEROL) 50,000 unit capsule Take 1 Cap by mouth every seven (7) days. 12 Cap 1     Past History     Past Medical History:  Past Medical History:   Diagnosis Date    Aggression 3/22/2013    [facility]ipolar [facility] 3/22/2013    Bipolar disorder, in partial remission, most recent episode depressed ([facility] 7/26/2017    Heart murmur 11/23/2015    History of traumatic brain injury 11/23/2015    Ill-defined condition     2012 assisted living     Psychiatric disorder     anxiety,bipolar    Psychotic disorder ([facility]     Trauma     head injury as child    Vitamin D deficiency 11/20/2017     Past Surgical History:  Past Surgical History:   Procedure Laterality Date    [place] COLONOSCOPY FLX DX W/COLLJ SPEC WHEN PFRMD  6/13/2014          Family History:  History reviewed. No pertinent family history. Social History:  Social History     Tobacco Use    Smoking status: Former Smoker    Smokeless tobacco: Never Used   Substance Use Topics    Alcohol use: No     Alcohol/week: 0.0 standard drinks    Drug use: No     Comment: denies      Allergies:  No Known Allergies  Review of Systems   Review of Systems   Constitutional: Negative for chills and fever. HENT: Negative for congestion, rhinorrhea, sneezing and sore throat. Eyes: Negative for redness and visual disturbance. Respiratory: Negative for shortness of breath. Cardiovascular: Negative for chest pain and leg swelling. Gastrointestinal: Negative for abdominal pain, nausea and vomiting. Genitourinary: Negative for difficulty urinating and frequency. Musculoskeletal: Negative for back pain, myalgias and neck stiffness. Skin: Negative for rash. Neurological: Negative for dizziness, syncope, weakness and headaches. Hematological: Negative for adenopathy. All other systems reviewed and are negative. Physical Exam   Physical Exam  Vitals signs and nursing note reviewed. Constitutional:       Appearance: Normal appearance. He is well-developed. Comments:  In police handcuffs behind the back, in no distress, cooperative   HENT:      Head: Normocephalic and atraumatic. Neck:      Musculoskeletal: Full passive range of motion without pain, normal range of motion and neck supple. Cardiovascular:      Rate and Rhythm: Normal rate and regular rhythm. Pulses: Normal pulses. Heart sounds: Normal heart sounds. No murmur. Pulmonary:      Effort: Pulmonary effort is normal. No respiratory distress. Breath sounds: Normal breath sounds. Chest:      Chest wall: No tenderness. Abdominal:      General: Bowel sounds are normal.      Palpations: Abdomen is soft. Tenderness: There is no abdominal tenderness. There is no guarding or rebound. Skin:     General: Skin is warm and dry. Findings: No erythema or rash. Neurological:      Mental Status: He is alert and oriented to person, place, and time. Psychiatric:         Speech: Speech normal.         Behavior: Behavior normal.         Thought Content: Thought content normal. Thought content does not include homicidal or suicidal ideation. Judgment: Judgment normal.       Diagnostic Study Results   Labs -   No results found for this or any previous visit (from the past 12 hour(s)). Radiologic Studies -   No orders to display     No results found. Medical Decision Making   I am the first provider for this patient. I reviewed the vital signs, available nursing notes, past medical history, past surgical history, family history and social history. Vital Signs-Reviewed the patient's vital signs. Patient Vitals for the past 24 hrs:   Temp Pulse Resp BP SpO2   09/15/20 1425 98.3 °F (36.8 °C) 78 18 (!) 136/90 100 %     Pulse Oximetry Analysis - 100% on RA    Records Reviewed: Nursing Notes and Old Medical Records    Provider Notes (Medical Decision Making):   60-year-old male brought in by police under an ECO for behavioral health medical evaluation.   Patient denies suicidal or homicidal lesions, has been cooperative, and has no current complaints. I believe this was a frustration rather than a true psychiatric event. Will have [facility] gregg see and evaluate the patient but will hold off on testing at this time as he has no other complaints. ED Course:   Initial assessment performed. The patients presenting problems have been discussed, and they are in agreement with the care plan formulated and outlined with them. I have encouraged them to ask questions as they arise throughout their visit. Patient has been seen by [facility] gregg. I do not feel that patient requires psychiatric admission at this time. Crisis agrees that there is no indications for TDO at this time. Will discharge with outpatient follow-up with [facility]. Patient agrees with this plan. Progress Note:   Updated pt on all returned results and findings. Discussed the importance of proper follow up as referred below along with return precautions. Pt in agreement with the care plan and expresses agreement with and understanding of all items discussed. Disposition:  Discharge Note:  The pt is ready for discharge. The pt's signs, symptoms, diagnosis, and discharge instructions have been discussed and pt has conveyed their understanding. The pt is to follow up as recommended or return to ER should their symptoms worsen. Plan has been discussed and pt is in agreement. PLAN:  1. Discharge Medication List as of 9/15/2020  3:17 PM        2. Follow-up Information     Follow up With Specialties Details Why [address] [facility]  Schedule an appointment as soon as possible for a visit  [address]   [phone]    [facility] EMERGENCY DEPT Emergency Medicine  As needed, If symptoms worsen [address] [facility]  [phone]        Return to ED if worse     Diagnosis     Clinical Impression:   1. Noncompliance with medication regimen    2.  Encounter for medical screening examination Please note that this dictation was completed with Dragon, computer voice recognition software. Quite often unanticipated grammatical, syntax, homophones, and other interpretive errors are inadvertently transcribed by the computer software. Please disregard these errors. Additionally, please excuse any errors that have escaped final proofreading.

## 2020-09-15 NOTE — DISCHARGE INSTRUCTIONS
Patient Education        Learning About Mood Disorders  What are mood disorders? Mood disorders are medical problems that affect how you feel. They can impact your moods, thoughts, and actions. Mood disorders include:  · Depression. This causes you to feel sad or hopeless for much of the time. · Bipolar disorder. This causes extreme mood changes from manic episodes of very high energy to extreme lows of depression. · Seasonal affective disorder (SAD). This is a type of depression that affects you during the same season each year. Most often people experience SAD during the fall and winter months when days are shorter and there is less light. What are the symptoms? Depression  You may:  · Feel sad or hopeless nearly every day. · Lose interest in or not get pleasure from most daily activities. You feel this way nearly every day. · Have low energy, changes in your appetite, or changes in how well you sleep. · Have trouble concentrating. · Think about death and suicide. Keep the numbers for these national suicide hotlines: 7-609-861-TALK (2-165.604.3954) and 5-418-HGRZMRI (7-991.384.6514). If you or someone you know talks about suicide or feeling hopeless, get help right away. Bipolar disorder  Symptoms depend on your mood swings. You may:  · Feel very happy, energetic, or on edge. · Feel like you need very little sleep. · Feel overly self-confident. · Do impulsive things, such as spending a lot of money. · Feel sad or hopeless. · Have racing thoughts or trouble thinking and making decisions. · Lose interest in things you have enjoyed in the past.  · Think about death and suicide. Keep the numbers for these national suicide hotlines: 0-941-127-TALK (3-740.541.3891) and 3-265-ATQRXJW (0-402.652.4363). If you or someone you know talks about suicide or feeling hopeless, get help right away. Seasonal affective disorder (SAD)  Symptoms come and go at about the same time each year.  For most people with SAD, symptoms come during the winter when there is less daylight. You may:  · Feel sad, grumpy, paulino, or anxious. · Lose interest in your usual activities. · Eat more and crave carbohydrates, such as bread and pasta. · Gain weight. · Sleep more and feel drowsy during the daytime. How are mood disorders treated? Mood disorders can be treated with medicines or counseling, or a combination of both. Medicines for depression and SAD may include antidepressants. Medicines for bipolar disorder may include:  · Mood stabilizers. · Antipsychotics. · Benzodiazepines. Counseling may involve cognitive-behavioral therapy. It teaches you how to change the ways you think and behave. This can help you stop thinking bad thoughts about yourself and your life. Light therapy is the main treatment for SAD. This therapy uses a special kind of lamp. You let the lamp shine on you at certain times, usually in the morning. This may help your symptoms during the months when there is less sunlight. Healthy lifestyle  Healthy lifestyle changes may help you feel better. · Be active often. You might try walking or strength training. · Eat a healthy diet. Include fruits, vegetables, lean proteins, and whole grains in your diet each day. · Keep a regular sleep schedule. Try for 8 hours of sleep a night. · Find ways to manage stress, such as relaxation exercises. · Avoid alcohol and illegal drugs. Follow-up care is a key part of your treatment and safety. Be sure to make and go to all appointments, and call your doctor if you are having problems. It's also a good idea to know your test results and keep a list of the medicines you take. Where can you learn more? Go to http://elizabeth-rabia.info/  Enter Z126 in the search box to learn more about \"Learning About Mood Disorders. \"  Current as of: January 31, 2020               Content Version: 12.6  © 7004-7320 [employer].    Care instructions adapted under license by [employer] (which disclaims liability or warranty for this information). If you have questions about a medical condition or this instruction, always ask your healthcare professional. [address] any warranty or liability for your use of this information.

## 2020-09-15 NOTE — ED TRIAGE NOTES
Pt arrives under [facility] starting at [place] [address] via [facility]. Pt calm and cooperative handcuffed in the front.

## 2021-04-23 ENCOUNTER — OFFICE VISIT (OUTPATIENT)
Dept: FAMILY MEDICINE CLINIC | Age: 31
End: 2021-04-23
Payer: COMMERCIAL

## 2021-04-23 VITALS
RESPIRATION RATE: 12 BRPM | HEART RATE: 73 BPM | WEIGHT: 192 LBS | BODY MASS INDEX: 23.87 KG/M2 | DIASTOLIC BLOOD PRESSURE: 79 MMHG | SYSTOLIC BLOOD PRESSURE: 113 MMHG | TEMPERATURE: 97.8 F | OXYGEN SATURATION: 99 % | HEIGHT: 75 IN

## 2021-04-23 DIAGNOSIS — Z76.89 ENCOUNTER TO ESTABLISH CARE: Primary | ICD-10-CM

## 2021-04-23 DIAGNOSIS — Z00.00 ROUTINE GENERAL MEDICAL EXAMINATION AT A HEALTH CARE FACILITY: ICD-10-CM

## 2021-04-23 DIAGNOSIS — Z11.59 NEED FOR HEPATITIS C SCREENING TEST: ICD-10-CM

## 2021-04-23 DIAGNOSIS — E55.9 VITAMIN D DEFICIENCY: ICD-10-CM

## 2021-04-23 DIAGNOSIS — F31.61 BIPOLAR DISORDER, CURRENT EPISODE MIXED, MILD (HCC): Chronic | ICD-10-CM

## 2021-04-23 DIAGNOSIS — Z11.4 ENCOUNTER FOR SCREENING FOR HIV: ICD-10-CM

## 2021-04-23 DIAGNOSIS — Z11.3 SCREEN FOR STD (SEXUALLY TRANSMITTED DISEASE): ICD-10-CM

## 2021-04-23 PROCEDURE — 99203 OFFICE O/P NEW LOW 30 MIN: CPT | Performed by: NURSE PRACTITIONER

## 2021-04-23 RX ORDER — TRAZODONE HYDROCHLORIDE 50 MG/1
50 TABLET ORAL
COMMUNITY
Start: 2021-04-20

## 2021-04-23 RX ORDER — QUETIAPINE FUMARATE 100 MG/1
100 TABLET, FILM COATED ORAL
COMMUNITY
Start: 2021-04-20

## 2021-04-23 NOTE — PROGRESS NOTES
Chief Complaint   Patient presents with   [address]     Pt concerned with \"pounding sensation\" over right forehead where old injury from childhood. 1. Have you been to the ER, urgent care clinic since your last visit? Hospitalized since your last visit? No    2. Have you seen or consulted any other health care providers outside of the [address] [place] [address] since your last visit? Include any pap smears or colon screening.  No    Health Maintenance Due   Topic Date Due    Hepatitis C Screening  Never done    COVID-19 Vaccine (1) Never done

## 2021-04-23 NOTE — PROGRESS NOTES
April Cadet (: 1990) is a 28 y.o. male, established patient, here for evaluation of the following chief complaint(s):  Establish Care       ASSESSMENT/PLAN:  Below is the assessment and plan developed based on review of pertinent history, physical exam, labs, studies, and medications. 1. Encounter to establish care  2. Routine general medical examination at a health care facility  -     CBC WITH AUTOMATED DIFF; Future  -     METABOLIC PANEL, COMPREHENSIVE; Future  -     HEMOGLOBIN A1C WITH EAG; Future  -     TSH 3RD GENERATION; Future  -     LIPID PANEL; Future  3. Bipolar disorder, current episode mixed, mild ([facility] [address]  4. Vitamin D deficiency  -     VITAMIN D, 25 HYDROXY; Future  5. Need for hepatitis C screening test  -     HEPATITIS C AB; Future  6. Encounter for screening for HIV  -     HIV 1/2 AG/AB, 4TH GENERATION,W RFLX CONFIRM; Future  7. Screen for STD (sexually transmitted disease)  -     T PALLIDUM AB; Future  -     CT/NG/T.VAGINALIS AMPLIFICATION; Future      Return in about 1 year (around 2022), or if symptoms worsen or fail to improve. SUBJECTIVE/OBJECTIVE:  HPI  Patient comes in today to establish care. Comes in today to [facility]. [facility] - sees psych for bipolar disorder    Plays basketball. Lives in [place]. Does some cooking at home, also eats out some.   Drinks water, some occasional soda  Allergies   Allergen Reactions    Haldol [Haloperidol Lactate] Other (comments)       Past Medical History:   Diagnosis Date    Aggression 3/22/2013    Anxiety     Bipolar disorder, in partial remission, most recent episode depressed ([facility] [address] 2017    Heart murmur 2015    History of traumatic brain injury 2015    PTSD (post-traumatic stress disorder)     states from fall as child    TBI (traumatic brain injury) [facility])     age 10yo, fell down steps at home    Vitamin D deficiency 2017       Past Surgical History:   Procedure Laterality Date  ID COLONOSCOPY FLX DX W/COLLJ SPEC WHEN PFRMD  6/13/2014            Social History     Socioeconomic History    Marital status: SINGLE     Spouse name: Not on file    Number of children: 0    Years of education: Not on file    Highest education level: Not on file   Occupational History     Comment: day program, light work    Social Needs    Financial resource strain: Not on file    Food insecurity     Worry: Not on file     Inability: Not on file   [employer] needs     Medical: Not on file     Non-medical: Not on file   Tobacco Use    Smoking status: Current Every Day Smoker     Packs/day: 1.00     Types: Cigars    Smokeless tobacco: Never Used    Tobacco comment: about 1 black & mild   Substance and Sexual Activity    Alcohol use: No     Alcohol/week: 0.0 standard drinks    Drug use: No     Comment: denies     Sexual activity: Yes     Partners: Female     Birth control/protection: Condom   Lifestyle    Physical activity     Days per week: Not on file     Minutes per session: Not on file    Stress: Not on file   Relationships    Social connections     Talks on phone: Not on file     Gets together: Not on file     Attends Latter-day service: Not on file     Active member of club or organization: Not on file     Attends meetings of clubs or organizations: Not on file     Relationship status: Not on file    Intimate partner violence     Fear of current or ex partner: Not on file     Emotionally abused: Not on file     Physically abused: Not on file     Forced sexual activity: Not on file   Other Topics Concern     Service Not Asked    Blood Transfusions Not Asked    Caffeine Concern Not Asked    Occupational Exposure Not Asked   [place] Hazards Not Asked    Sleep Concern Not Asked    Stress Concern Not Asked    Weight Concern Not Asked    Special Diet Not Asked    Back Care Yes    Exercise Yes    Bike Helmet Not Asked    Seat Belt Yes    Self-Exams Not Asked   Social History Narrative    Not on file       Family History   Problem Relation Age of Onset    No Known Problems Mother         has not seen since age 26yo    Cancer Father         unknown       Current Outpatient Medications   Medication Sig    traZODone (DESYREL) 50 mg tablet Take 50 mg by mouth nightly as needed for Sleep.  QUEtiapine (SEROquel) 100 mg tablet Take 100 mg by mouth nightly as needed. No current facility-administered medications for this visit. Review of Systems   Constitutional: Negative for appetite change, chills, fatigue, fever and unexpected weight change. Respiratory: Negative for cough and shortness of breath. Cardiovascular: Negative for chest pain, palpitations and leg swelling. Gastrointestinal: Negative for abdominal pain, constipation, diarrhea, nausea and vomiting. Endocrine: Negative for polydipsia, polyphagia and polyuria. Genitourinary: Negative for dysuria, frequency and urgency. Neurological: Negative for dizziness, light-headedness, numbness and headaches. Psychiatric/Behavioral: Negative for dysphoric mood and sleep disturbance. The patient is not nervous/anxious. Vitals:    04/23/21 0858   BP: 113/79   Pulse: 73   Resp: 12   Temp: 97.8 °F (36.6 °C)   TempSrc: Skin   SpO2: 99%   Weight: 192 lb (87.1 kg)   Height: 6' 3\" (1.905 m)     Physical Exam  Constitutional:       Appearance: Normal appearance. He is well-developed. HENT:      Right Ear: Hearing normal.      Left Ear: Hearing normal.   Neck:      Thyroid: No thyromegaly. Cardiovascular:      Rate and Rhythm: Normal rate and regular rhythm. Pulses:           Dorsalis pedis pulses are 2+ on the right side and 2+ on the left side. Heart sounds: Normal heart sounds. Pulmonary:      Effort: Pulmonary effort is normal.      Breath sounds: Normal breath sounds. Abdominal:      General: Bowel sounds are normal.      Palpations: Abdomen is soft. Tenderness:  There is no abdominal tenderness. Musculoskeletal:      Right lower leg: No edema. Left lower leg: No edema. Lymphadenopathy:      Head:      Right side of head: No submental, submandibular or tonsillar adenopathy. Left side of head: No submental, submandibular or tonsillar adenopathy. Cervical: No cervical adenopathy. Skin:     General: Skin is warm and dry. Neurological:      Mental Status: He is alert and oriented to person, place, and time. Psychiatric:         Attention and Perception: Attention normal.         Mood and Affect: Mood and affect normal.         Speech: Speech normal.         Behavior: Behavior normal. Behavior is cooperative. Thought Content: Thought content normal.         Cognition and Memory: Cognition and memory normal.         Judgment: Judgment normal.         On this date 04/23/2021 I have spent 30 minutes reviewing previous notes, test results and face to face with the patient discussing the diagnosis and importance of compliance with the treatment plan as well as documenting on the day of the visit. An electronic signature was used to authenticate this note.   -- Vannesa Randolph NP

## 2021-04-24 LAB
25(OH)D3 SERPL-MCNC: 17.8 NG/ML (ref 30–100)
ALBUMIN SERPL-MCNC: 3.6 G/DL (ref 3.5–5)
ALBUMIN/GLOB SERPL: 1.3 {RATIO} (ref 1.1–2.2)
ALP SERPL-CCNC: 55 U/L (ref 45–117)
ALT SERPL-CCNC: 46 U/L (ref 12–78)
ANION GAP SERPL CALC-SCNC: 8 MMOL/L (ref 5–15)
AST SERPL-CCNC: 37 U/L (ref 15–37)
BASOPHILS # BLD: 0.1 K/UL (ref 0–0.1)
BASOPHILS NFR BLD: 2 % (ref 0–1)
BILIRUB SERPL-MCNC: 0.6 MG/DL (ref 0.2–1)
BUN SERPL-MCNC: 11 MG/DL (ref 6–20)
BUN/CREAT SERPL: 11 (ref 12–20)
CALCIUM SERPL-MCNC: 9.3 MG/DL (ref 8.5–10.1)
CHLORIDE SERPL-SCNC: 105 MMOL/L (ref 97–108)
CHOLEST SERPL-MCNC: 169 MG/DL
CO2 SERPL-SCNC: 29 MMOL/L (ref 21–32)
CREAT SERPL-MCNC: 0.97 MG/DL (ref 0.7–1.3)
DIFFERENTIAL METHOD BLD: ABNORMAL
EOSINOPHIL # BLD: 0.6 K/UL (ref 0–0.4)
EOSINOPHIL NFR BLD: 7 % (ref 0–7)
ERYTHROCYTE [DISTWIDTH] IN BLOOD BY AUTOMATED COUNT: 13.7 % (ref 11.5–14.5)
EST. AVERAGE GLUCOSE BLD GHB EST-MCNC: 105 MG/DL
GLOBULIN SER CALC-MCNC: 2.8 G/DL (ref 2–4)
GLUCOSE SERPL-MCNC: 63 MG/DL (ref 65–100)
HBA1C MFR BLD: 5.3 % (ref 4–5.6)
HCT VFR BLD AUTO: 49.5 % (ref 36.6–50.3)
HCV AB SERPL QL IA: NONREACTIVE
HCV COMMENT,HCGAC: NORMAL
HDLC SERPL-MCNC: 73 MG/DL
HDLC SERPL: 2.3 {RATIO} (ref 0–5)
HGB BLD-MCNC: 15.6 G/DL (ref 12.1–17)
HIV 1+2 AB+HIV1 P24 AG SERPL QL IA: NONREACTIVE
HIV12 RESULT COMMENT, HHIVC: NORMAL
IMM GRANULOCYTES # BLD AUTO: 0 K/UL (ref 0–0.04)
IMM GRANULOCYTES NFR BLD AUTO: 0 % (ref 0–0.5)
LDLC SERPL CALC-MCNC: 77.8 MG/DL (ref 0–100)
LIPID PROFILE,FLP: NORMAL
LYMPHOCYTES # BLD: 1.3 K/UL (ref 0.8–3.5)
LYMPHOCYTES NFR BLD: 16 % (ref 12–49)
MCH RBC QN AUTO: 29.3 PG (ref 26–34)
MCHC RBC AUTO-ENTMCNC: 31.5 G/DL (ref 30–36.5)
MCV RBC AUTO: 93 FL (ref 80–99)
MONOCYTES # BLD: 0.7 K/UL (ref 0–1)
MONOCYTES NFR BLD: 8 % (ref 5–13)
NEUTS SEG # BLD: 5.5 K/UL (ref 1.8–8)
NEUTS SEG NFR BLD: 67 % (ref 32–75)
NRBC # BLD: 0 K/UL (ref 0–0.01)
NRBC BLD-RTO: 0 PER 100 WBC
PLATELET # BLD AUTO: 268 K/UL (ref 150–400)
PMV BLD AUTO: 11.9 FL (ref 8.9–12.9)
POTASSIUM SERPL-SCNC: 4.3 MMOL/L (ref 3.5–5.1)
PROT SERPL-MCNC: 6.4 G/DL (ref 6.4–8.2)
RBC # BLD AUTO: 5.32 M/UL (ref 4.1–5.7)
SODIUM SERPL-SCNC: 142 MMOL/L (ref 136–145)
TRIGL SERPL-MCNC: 91 MG/DL (ref ?–150)
TSH SERPL DL<=0.05 MIU/L-ACNC: 0.87 UIU/ML (ref 0.36–3.74)
VLDLC SERPL CALC-MCNC: 18.2 MG/DL
WBC # BLD AUTO: 8.2 K/UL (ref 4.1–11.1)

## 2021-04-26 LAB
C TRACH RRNA SPEC QL NAA+PROBE: NEGATIVE
N GONORRHOEA RRNA SPEC QL NAA+PROBE: NEGATIVE
SPECIMEN SOURCE: NORMAL
T PALLIDUM AB SER QL IA: NON REACTIVE
T VAGINALIS RRNA VAG QL NAA+PROBE: NEGATIVE

## 2021-05-10 RX ORDER — ERGOCALCIFEROL 1.25 MG/1
50000 CAPSULE ORAL
Qty: 13 CAP | Refills: 3 | Status: SHIPPED | OUTPATIENT
Start: 2021-05-10

## 2021-05-10 NOTE — PROGRESS NOTES
RECOMMENDATIONS:  Diabetes and thyroid screen negative. Liver and kidney function normal.  Blood counts normal (not anemic). Cholesterol numbers look great! HIV, syphilis, STD and Hepatitis C screening negative. Vitamin D is low. Please fill prescription for Vitamin D to take once weekly.

## 2021-05-11 ENCOUNTER — TELEPHONE (OUTPATIENT)
Dept: FAMILY MEDICINE CLINIC | Age: 31
End: 2021-05-11

## 2021-05-11 NOTE — TELEPHONE ENCOUNTER
----- Message from Kam Youssef NP sent at 5/10/2021  3:24 PM EDT -----  RECOMMENDATIONS:  Diabetes and thyroid screen negative. Liver and kidney function normal.  Blood counts normal (not anemic). Cholesterol numbers look great! HIV, syphilis, STD and Hepatitis C screening negative. Vitamin D is low. Please fill prescription for Vitamin D to take once weekly.

## 2022-03-19 PROBLEM — F31.75 BIPOLAR DISORDER, IN PARTIAL REMISSION, MOST RECENT EPISODE DEPRESSED (HCC): Status: ACTIVE | Noted: 2017-07-26

## 2022-03-19 PROBLEM — E55.9 VITAMIN D DEFICIENCY: Status: ACTIVE | Noted: 2017-11-20

## 2023-01-05 ENCOUNTER — OFFICE VISIT (OUTPATIENT)
Dept: FAMILY MEDICINE CLINIC | Age: 33
End: 2023-01-05
Payer: COMMERCIAL

## 2023-01-05 VITALS
TEMPERATURE: 98.2 F | HEIGHT: 75 IN | BODY MASS INDEX: 23.87 KG/M2 | WEIGHT: 192 LBS | OXYGEN SATURATION: 98 % | RESPIRATION RATE: 17 BRPM | SYSTOLIC BLOOD PRESSURE: 110 MMHG | DIASTOLIC BLOOD PRESSURE: 73 MMHG | HEART RATE: 81 BPM

## 2023-01-05 DIAGNOSIS — F51.05 INSOMNIA DUE TO OTHER MENTAL DISORDER: ICD-10-CM

## 2023-01-05 DIAGNOSIS — F99 INSOMNIA DUE TO OTHER MENTAL DISORDER: ICD-10-CM

## 2023-01-05 DIAGNOSIS — E55.9 VITAMIN D DEFICIENCY: ICD-10-CM

## 2023-01-05 DIAGNOSIS — F31.9 BIPOLAR AFFECTIVE DISORDER, REMISSION STATUS UNSPECIFIED (HCC): Primary | ICD-10-CM

## 2023-01-05 RX ORDER — TRAZODONE HYDROCHLORIDE 50 MG/1
50 TABLET ORAL
Qty: 90 TABLET | Refills: 1 | Status: SHIPPED | OUTPATIENT
Start: 2023-01-05

## 2023-01-05 RX ORDER — QUETIAPINE FUMARATE 100 MG/1
100 TABLET, FILM COATED ORAL
Qty: 90 TABLET | Refills: 0 | Status: SHIPPED | OUTPATIENT
Start: 2023-01-05

## 2023-01-05 RX ORDER — ERGOCALCIFEROL 1.25 MG/1
50000 CAPSULE ORAL
Qty: 13 CAPSULE | Refills: 3 | Status: SHIPPED | OUTPATIENT
Start: 2023-01-05

## 2023-01-05 NOTE — PROGRESS NOTES
[address]  [address] [place], [address]  [phone]    Chief Complaint: Bipolar and insomnia    Subjective  Bertha Crane is a 33 y.o. BLACK/ male , established patient, here for evaluation of the concern(s) above;    Seeing patient for initial visi0t with me. Acute/chronic Concerns include:   Bipolar Disorder:  States that he goes to [facility] and sees psych there. 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 Also has issues with transportation. Co-worker dropped him here today. Insomnia:  On trazodone as explained above    General Health Habits:  Smoking Hx: no   Alcohol Use: no   Occupation: Works as a  for a [employer]. Lives alone. Has siblings. Allergies - reviewed:    Allergies   Allergen Reactions    Haldol [Haloperidol Lactate] Other (comments)       Past Medical History - reviewed:  Past Medical History:   Diagnosis Date    Aggression 3/22/2013    Anxiety     Bipolar disorder, in partial remission, most recent episode depressed ([facility] [address] 7/26/2017    Heart murmur 11/23/2015    History of traumatic brain injury 11/23/2015    PTSD (post-traumatic stress disorder)     states from fall as child    TBI (traumatic brain injury)     age 10yo, fell down steps at home    Vitamin D deficiency 11/20/2017       Depression screening:  3 most recent PHQ Screens 1/5/2023   Little interest or pleasure in doing things Not at all   Feeling down, depressed, irritable, or hopeless Not at all   Total Score PHQ 2 0   Trouble falling or staying asleep, or sleeping too much Not at all   Feeling tired or having little energy Not at all   Poor appetite, weight loss, or overeating Not at all   Feeling bad about yourself - or that you are a failure or have let yourself or your family down Not at all   Trouble concentrating on things such as school, work, reading, or watching TV Not at all   Moving or speaking so slowly that other people could have noticed; or the opposite being so fidgety that others notice Not at all   Thoughts of being better off dead, or hurting yourself in some way Not at all   PHQ 9 Score 0   How difficult have these problems made it for you to do your work, take care of your home and get along with others Not difficult at all         Review of systems:  Items bolded if positive. Constitutional: Fever, chills, night sweats, weight loss, lymphadenopathy, fatigue  HEENT: Vision change, eye pain, rhinorrhea, sinus pain, epistaxis, dysphagia, change in hearing, tinnitus, vertigo. Endocrine: Weight change, heat/ cold intolerance, tremor, insomnia, polyuria, polydipsia, polyphagia, abnl hair growth, nail changes  Cardiovascular: Chest pain, palpitations, syncope, lower extremity edema, orthopnea, paroxysmal nocturnal dyspnea  Pulmonary: Shortness of breath, dyspnea on exertion, cough, hemoptysis, wheezing  GI: Nausea, vomiting, diarrhea, melena, hematochezia, change in appetite, abdominal pain, change in bowel habits or stools  : Dysuria, frequency, urgency, incontinence, hematuria, nocturia  Musculoskeletal: joint swelling or pain, muscle pain, back pain  Skin:  Rash, New/growing/changing skin lesions  Neurologic: Headache, muscle weakness, paresthesias, anesthesia, ataxia, change in speech, change in gait   Psychiatric: depression, anxiety, hallucinations, dima, SI/HI    Physical Exam  Visit Vitals  /73 (BP 1 Location: Right arm, BP Patient Position: Sitting, BP Cuff Size: Adult)   Pulse 81   Temp 98.2 °F (36.8 °C) (Oral)   Resp 17   Ht 6' 3\" (1.905 m)   Wt 192 lb (87.1 kg)   SpO2 98%   BMI 24.00 kg/m²       General: Alert and oriented, in no acute distress. Well nourished. SKIN: No rash. No suspicious lesions or moles. EYE: PERRL. Sclera and conjuctival clear. Extraocular movements intact.   EARS: External normal, canals clear, tympanic membranes normal.   NOSE: Mucosa healthy without drainage or ulceration. OROPHARYNX: No suspicious lesions, normal dentition, pharynx, tongue and tonsils normal.  NECK: Supple; no masses; thyroid normal.  LYMPH NODES: No significant cervial lymphadenopathy. LUNGS: Respirations unlabored; clear to auscultation bilaterally. CARDIOVASCULAR: Regular, rate, and rhythm without murmurs, gallops or rubs. ABDOMEN: Soft; nontender; nondistended; normoactive bowel sounds; no masses or organomegaly. MUSCULOSKELETAL: FROM in all extremities     EXT: No edema. Neurovascularlly intact. Normal gait. Neurological: Alert and oriented X 3, normal strength and tone. Normal symmetric reflexes. Normal coordination and gait     MENTAL STATUS EXAMINATION:   Patient appears stated age. Patient appearance is nicely dressed with good hygiene. Speech is regular rate and rhythm, fluent language, and thought process is linear, logical, and goal directed. Reported mood is \"good\" . This was congruent with the topics we were discussing and pt complaints. Patient denies any suicidal ideation, homicidal ideation,manic symptoms and auditory visual hallucination. Not observed to be delusional or paranoid. Insight, judgement and impulse control is good. Cognition was within normal limit. Assessment/Plan    ICD-10-CM ICD-9-CM    1. Bipolar affective disorder, remission status unspecified (HCC)  F31.9 296.80 QUEtiapine (SEROquel) 100 mg tablet      2. Insomnia due to other mental disorder  F51.05 300.9 traZODone (DESYREL) 50 mg tablet    F99 327.02       3. Vitamin D deficiency  E55.9 268.9 ergocalciferol (ERGOCALCIFEROL) 1,250 mcg (50,000 unit) capsule          1. Bipolar affective disorder, remission status unspecified (HCC)    - QUEtiapine (SEROquel) 100 mg tablet; Take 1 Tablet by mouth nightly. 2. Insomnia due to other mental disorder  - traZODone (DESYREL) 50 mg tablet; Take 1 Tablet by mouth nightly.       3. Vitamin D deficiency  - ergocalciferol (ERGOCALCIFEROL) 1,250 mcg (50,000 unit) capsule; Take 1 Capsule by mouth every seven (7) days. Follow up: 3 months or sooner if needed    We discussed the expected course, resolution and complications of the diagnosis(es) in detail. Medication risks, benefits, costs, interactions, and alternatives were discussed as indicated. I advised him to contact the office if his condition worsens, changes or fails to improve as anticipated. He expressed understanding with the diagnosis(es) and plan. Patient understands that this encounter was a temporary measure, and the importance of further follow up and examination was emphasized. Patient verbalized understanding.       Signed By: Althea Cruz MD     January 5, 2023

## 2023-01-05 NOTE — PROGRESS NOTES
Name and  Verified. Previous PCP: Has seen various PCP with in [facility] NP [place],Dr. Kayla Onofre, Dr. Yaya Trinidad verified     Chief Complaint   Patient presents with    Physical         1. Have you been to the ER, urgent care clinic since your last visit? Hospitalized since your last visit? Yes  2022  [facility]  Cellulitis    2. Have you seen or consulted any other health care providers outside of the [address] [place] [address] since your last visit? Include any pap smears or colon screening.  No      Health Maintenance Due   Topic Date Due    COVID-19 Vaccine (1) Never done    Pneumococcal 0-64 years (1 - PCV) Never done    Depression Monitoring  2022    Flu Vaccine (1) 2022

## 2024-03-09 NOTE — ED NOTES
Discharge instructions were given to the patient by Gladys Stephenson RN. The patient left the Emergency Department ambulatory, alert and oriented and in no acute distress with no prescriptions. The patient was encouraged to call or return to the ED for worsening issues or problems and was encouraged to schedule a follow up appointment for continuing care. The patient verbalized understanding of discharge instructions and prescriptions, all questions were answered. The patient has no further concerns at this time.
Pt arrives under [facility]. Pt states he was with his  picking up a prescription and was upset that the  was talking loudly for everyone to hear his medications and his \"business\". Pt stated he walked off after yelling at his . Pt states he stopped taking his medication \"trazadone\" because that doesn't treat bi-polar. Pt is calm and cooperative with Dr. Sarita Daniels and this Daralene Dose. Pt denies SI/HI and AVH. Pt stated he doesn't have his tracker for his ankle monitor due to being picked up by police and was concerned about that. Pt was advised that a [employer] representative would be in to speak with momentarily. Dr. Sarita Daniels advised to order a meal tray for the patient. Meal tray was ordered.
Pt is alert and oriented x 4, RR even and unlabored, skin is warm and dry. Assessment completed and pt updated on plan of care. Emergency Department Nursing Plan of Care       The Nursing Plan of Care is developed from the Nursing assessment and Emergency Department Attending provider initial evaluation. The plan of care may be reviewed in the ED Provider note.     The Plan of Care was developed with the following considerations:   Patient / Family readiness to learn indicated by:verbalized understanding  Persons(s) to be included in education: patient  Barriers to Learning/Limitations:No    Signed     Bridgett Chavez RN    9/15/2020   2:30 PM
Pt smart at bedside
Spoke with Erasto Razo with [employer] who advised he would talk with Dr. Peggy Bermeo about patient not meeting TDO criteria.
09-Mar-2024 09:45

## 2024-04-17 ENCOUNTER — TELEPHONE (OUTPATIENT)
Age: 34
End: 2024-04-17

## 2024-04-17 NOTE — TELEPHONE ENCOUNTER
[facility]  Staff  Subject: Appointment Request    Reason for Call: Established Patient Appointment needed: Routine Existing  Condition Follow Up    QUESTIONS    Reason for appointment request? No appointments available during search     Additional Information for Provider? Lillie Ramsey from [facility]  [facility] (mental health provider) is with the  patient and is attempting to schedule a well visit for the patient.  Lillie is asking that the scheduling be coordinated through her. Her call  back information is [phone]. Patient's phone is accurate and up to  date as well.

## 2024-04-23 ENCOUNTER — TELEPHONE (OUTPATIENT)
Age: 34
End: 2024-04-23

## 2024-07-03 ENCOUNTER — OFFICE VISIT (OUTPATIENT)
Age: 34
End: 2024-07-03
Payer: COMMERCIAL

## 2024-07-03 VITALS
OXYGEN SATURATION: 98 % | WEIGHT: 198.85 LBS | RESPIRATION RATE: 16 BRPM | TEMPERATURE: 98.1 F | BODY MASS INDEX: 24.73 KG/M2 | SYSTOLIC BLOOD PRESSURE: 114 MMHG | DIASTOLIC BLOOD PRESSURE: 75 MMHG | HEIGHT: 75 IN | HEART RATE: 80 BPM

## 2024-07-03 DIAGNOSIS — Z11.3 SCREEN FOR STD (SEXUALLY TRANSMITTED DISEASE): ICD-10-CM

## 2024-07-03 DIAGNOSIS — Z00.00 ANNUAL PHYSICAL EXAM: Primary | ICD-10-CM

## 2024-07-03 DIAGNOSIS — E55.9 VITAMIN D DEFICIENCY: ICD-10-CM

## 2024-07-03 DIAGNOSIS — R73.09 ABNORMAL GLUCOSE: ICD-10-CM

## 2024-07-03 DIAGNOSIS — E78.2 MIXED HYPERLIPIDEMIA: ICD-10-CM

## 2024-07-03 DIAGNOSIS — Z00.00 ANNUAL PHYSICAL EXAM: ICD-10-CM

## 2024-07-03 LAB
25(OH)D3 SERPL-MCNC: 15 NG/ML (ref 30–100)
ALBUMIN SERPL-MCNC: 3.7 G/DL (ref 3.5–5)
ALBUMIN/GLOB SERPL: 1.3 (ref 1.1–2.2)
ALP SERPL-CCNC: 58 U/L (ref 45–117)
ALT SERPL-CCNC: 40 U/L (ref 12–78)
ANION GAP SERPL CALC-SCNC: 7 MMOL/L (ref 5–15)
AST SERPL-CCNC: 35 U/L (ref 15–37)
BILIRUB SERPL-MCNC: 0.5 MG/DL (ref 0.2–1)
BUN SERPL-MCNC: 12 MG/DL (ref 6–20)
BUN/CREAT SERPL: 12 (ref 12–20)
CALCIUM SERPL-MCNC: 9.3 MG/DL (ref 8.5–10.1)
CHLORIDE SERPL-SCNC: 107 MMOL/L (ref 97–108)
CHOLEST SERPL-MCNC: 140 MG/DL
CO2 SERPL-SCNC: 26 MMOL/L (ref 21–32)
CREAT SERPL-MCNC: 0.98 MG/DL (ref 0.7–1.3)
GLOBULIN SER CALC-MCNC: 2.9 G/DL (ref 2–4)
GLUCOSE SERPL-MCNC: 83 MG/DL (ref 65–100)
HBV SURFACE AG SER QL: 0.1 INDEX
HBV SURFACE AG SER QL: NEGATIVE
HCV AB SER IA-ACNC: <0.02 INDEX
HCV AB SERPL QL IA: NONREACTIVE
HDLC SERPL-MCNC: 67 MG/DL
HDLC SERPL: 2.1 (ref 0–5)
HIV 1+2 AB+HIV1 P24 AG SERPL QL IA: NONREACTIVE
HIV 1/2 RESULT COMMENT: NORMAL
LDLC SERPL CALC-MCNC: 61 MG/DL (ref 0–100)
POTASSIUM SERPL-SCNC: 4.5 MMOL/L (ref 3.5–5.1)
PROT SERPL-MCNC: 6.6 G/DL (ref 6.4–8.2)
RPR SER QL: NONREACTIVE
SODIUM SERPL-SCNC: 140 MMOL/L (ref 136–145)
TRIGL SERPL-MCNC: 60 MG/DL
VLDLC SERPL CALC-MCNC: 12 MG/DL

## 2024-07-03 PROCEDURE — 99395 PREV VISIT EST AGE 18-39: CPT | Performed by: STUDENT IN AN ORGANIZED HEALTH CARE EDUCATION/TRAINING PROGRAM

## 2024-07-03 SDOH — ECONOMIC STABILITY: HOUSING INSECURITY
IN THE LAST 12 MONTHS, WAS THERE A TIME WHEN YOU DID NOT HAVE A STEADY PLACE TO SLEEP OR SLEPT IN A SHELTER (INCLUDING NOW)?: NO

## 2024-07-03 SDOH — ECONOMIC STABILITY: INCOME INSECURITY: HOW HARD IS IT FOR YOU TO PAY FOR THE VERY BASICS LIKE FOOD, HOUSING, MEDICAL CARE, AND HEATING?: NOT HARD AT ALL

## 2024-07-03 SDOH — ECONOMIC STABILITY: FOOD INSECURITY: WITHIN THE PAST 12 MONTHS, THE FOOD YOU BOUGHT JUST DIDN'T LAST AND YOU DIDN'T HAVE MONEY TO GET MORE.: NEVER TRUE

## 2024-07-03 SDOH — ECONOMIC STABILITY: FOOD INSECURITY: WITHIN THE PAST 12 MONTHS, YOU WORRIED THAT YOUR FOOD WOULD RUN OUT BEFORE YOU GOT MONEY TO BUY MORE.: NEVER TRUE

## 2024-07-03 ASSESSMENT — PATIENT HEALTH QUESTIONNAIRE - PHQ9
4. FEELING TIRED OR HAVING LITTLE ENERGY: NOT AT ALL
8. MOVING OR SPEAKING SO SLOWLY THAT OTHER PEOPLE COULD HAVE NOTICED. OR THE OPPOSITE, BEING SO FIGETY OR RESTLESS THAT YOU HAVE BEEN MOVING AROUND A LOT MORE THAN USUAL: NOT AT ALL
5. POOR APPETITE OR OVEREATING: NOT AT ALL
SUM OF ALL RESPONSES TO PHQ9 QUESTIONS 1 & 2: 0
9. THOUGHTS THAT YOU WOULD BE BETTER OFF DEAD, OR OF HURTING YOURSELF: NOT AT ALL
7. TROUBLE CONCENTRATING ON THINGS, SUCH AS READING THE NEWSPAPER OR WATCHING TELEVISION: NOT AT ALL
SUM OF ALL RESPONSES TO PHQ QUESTIONS 1-9: 0
6. FEELING BAD ABOUT YOURSELF - OR THAT YOU ARE A FAILURE OR HAVE LET YOURSELF OR YOUR FAMILY DOWN: NOT AT ALL
3. TROUBLE FALLING OR STAYING ASLEEP: NOT AT ALL
10. IF YOU CHECKED OFF ANY PROBLEMS, HOW DIFFICULT HAVE THESE PROBLEMS MADE IT FOR YOU TO DO YOUR WORK, TAKE CARE OF THINGS AT HOME, OR GET ALONG WITH OTHER PEOPLE: NOT DIFFICULT AT ALL
2. FEELING DOWN, DEPRESSED OR HOPELESS: NOT AT ALL
SUM OF ALL RESPONSES TO PHQ QUESTIONS 1-9: 0
1. LITTLE INTEREST OR PLEASURE IN DOING THINGS: NOT AT ALL
SUM OF ALL RESPONSES TO PHQ QUESTIONS 1-9: 0
SUM OF ALL RESPONSES TO PHQ QUESTIONS 1-9: 0

## 2024-07-03 NOTE — PROGRESS NOTES
Identified pt with two pt identifiers(name and ). Reviewed record in preparation for visit and have obtained necessary documentation.  Chief Complaint   Patient presents with    Annual Exam        Health Maintenance Due   Topic    COVID-19 Vaccine (1)    Varicella vaccine (1 of 2 - 2-dose childhood series)    Pneumococcal 0-64 years Vaccine (1 of 2 - PCV)    Hepatitis B vaccine (2 of 3 - 3-dose series)    Depression Monitoring      Vitals:    24 1002   BP: 114/75   Site: Left Upper Arm   Position: Sitting   Cuff Size: Large Adult   Pulse: 80   Resp: 16   Temp: 98.1 °F (36.7 °C)   TempSrc: Oral   SpO2: 98%   Weight: 90.2 kg (198 lb 13.7 oz)   Height: 1.905 m (6' 3\")      Pain Scale: 0 - No pain/10    Coordination of Care Questionnaire:  :   1. Have you been to the ER, urgent care clinic since your last visit?  Hospitalized since your last visit?No    2. Have you seen or consulted any other health care providers outside of the [facility] since your last visit?  Include any pap smears or colon screening. No    
organomegaly  Extremities: extremities normal, atraumatic, no cyanosis or edema  Pulses: 2+ and symmetric  Skin: Skin color, texture, turgor normal. No rashes or lesions  Lymph nodes: Cervical, supraclavicular, and axillary nodes normal.  MSK: FROM in all extremities without any pain  Neurologic: Alert and oriented X 3, normal strength and tone. Normal symmetric reflexes. Normal coordination and gait    Depression screening:  PHQ-9 Total Score: 0 (7/3/2024 10:03 AM)  Thoughts that you would be better off dead, or of hurting yourself in some way: 0 (7/3/2024 10:03 AM)      Assessment/Plan     1. Annual physical exam  -     Comprehensive Metabolic Panel; Future  -     Lipid Panel; Future  -     Vitamin D 25 Hydroxy; Future  2. Vitamin D deficiency  -     Vitamin D 25 Hydroxy; Future  3. Mixed hyperlipidemia  -     Lipid Panel; Future  4. Abnormal glucose  -     Comprehensive Metabolic Panel; Future  5. Screen for STD (sexually transmitted disease)  -     RPR; Future  -     Hepatitis C Antibody; Future  -     HIV 1/2 Ag/Ab, 4TH Generation,W Rflx Confirm; Future  -     Hepatitis B Surface Antigen; Future       Discussed  importance of varied diet and minimizing junk food, physical activity, limiting screen time, regular dental care, seat belts/ sports protective gear/ helmet safety/ swimming safety, sunscreen, safe storage of any firearms in the home, healthy sexual awareness/relationships,  tobacco, alcohol and drug dangers    Follow up: 1 year.. RTC to clinic sooner should symptoms persist, worsen or fail to improve as anticipated.    We discussed the expected course, resolution and complications of the diagnosis(es) in detail.  Medication risks, benefits, costs, interactions, and alternatives were discussed as indicated.  I advised to contact the office if his condition worsens, changes or fails to improve as anticipated. Pt expressed understanding with the diagnosis(es) and plan. Patient understands that this

## 2025-06-03 ENCOUNTER — TELEPHONE (OUTPATIENT)
Age: 35
End: 2025-06-03

## 2025-06-04 NOTE — TELEPHONE ENCOUNTER
Attempted to contact patient regarding upcoming Medicare wellness appointment and completion of HRA questionnaire. No answer, No VM.

## 2025-07-03 ENCOUNTER — OFFICE VISIT (OUTPATIENT)
Age: 35
End: 2025-07-03

## 2025-07-03 VITALS
OXYGEN SATURATION: 98 % | TEMPERATURE: 97.9 F | BODY MASS INDEX: 23.64 KG/M2 | WEIGHT: 189.15 LBS | HEART RATE: 86 BPM | DIASTOLIC BLOOD PRESSURE: 72 MMHG | SYSTOLIC BLOOD PRESSURE: 108 MMHG | RESPIRATION RATE: 20 BRPM

## 2025-07-03 DIAGNOSIS — Z00.00 ANNUAL PHYSICAL EXAM: ICD-10-CM

## 2025-07-03 DIAGNOSIS — Z00.00 ANNUAL PHYSICAL EXAM: Primary | ICD-10-CM

## 2025-07-03 DIAGNOSIS — Z11.3 SCREEN FOR STD (SEXUALLY TRANSMITTED DISEASE): ICD-10-CM

## 2025-07-03 PROCEDURE — 99395 PREV VISIT EST AGE 18-39: CPT | Performed by: STUDENT IN AN ORGANIZED HEALTH CARE EDUCATION/TRAINING PROGRAM

## 2025-07-03 SDOH — ECONOMIC STABILITY: FOOD INSECURITY: WITHIN THE PAST 12 MONTHS, YOU WORRIED THAT YOUR FOOD WOULD RUN OUT BEFORE YOU GOT MONEY TO BUY MORE.: NEVER TRUE

## 2025-07-03 SDOH — ECONOMIC STABILITY: FOOD INSECURITY: WITHIN THE PAST 12 MONTHS, THE FOOD YOU BOUGHT JUST DIDN'T LAST AND YOU DIDN'T HAVE MONEY TO GET MORE.: NEVER TRUE

## 2025-07-03 ASSESSMENT — PATIENT HEALTH QUESTIONNAIRE - PHQ9
4. FEELING TIRED OR HAVING LITTLE ENERGY: NOT AT ALL
5. POOR APPETITE OR OVEREATING: NOT AT ALL
SUM OF ALL RESPONSES TO PHQ QUESTIONS 1-9: 0
SUM OF ALL RESPONSES TO PHQ QUESTIONS 1-9: 0
7. TROUBLE CONCENTRATING ON THINGS, SUCH AS READING THE NEWSPAPER OR WATCHING TELEVISION: NOT AT ALL
9. THOUGHTS THAT YOU WOULD BE BETTER OFF DEAD, OR OF HURTING YOURSELF: NOT AT ALL
SUM OF ALL RESPONSES TO PHQ QUESTIONS 1-9: 0
10. IF YOU CHECKED OFF ANY PROBLEMS, HOW DIFFICULT HAVE THESE PROBLEMS MADE IT FOR YOU TO DO YOUR WORK, TAKE CARE OF THINGS AT HOME, OR GET ALONG WITH OTHER PEOPLE: NOT DIFFICULT AT ALL
2. FEELING DOWN, DEPRESSED OR HOPELESS: NOT AT ALL
SUM OF ALL RESPONSES TO PHQ QUESTIONS 1-9: 0
6. FEELING BAD ABOUT YOURSELF - OR THAT YOU ARE A FAILURE OR HAVE LET YOURSELF OR YOUR FAMILY DOWN: NOT AT ALL
3. TROUBLE FALLING OR STAYING ASLEEP: NOT AT ALL
1. LITTLE INTEREST OR PLEASURE IN DOING THINGS: NOT AT ALL
8. MOVING OR SPEAKING SO SLOWLY THAT OTHER PEOPLE COULD HAVE NOTICED. OR THE OPPOSITE, BEING SO FIGETY OR RESTLESS THAT YOU HAVE BEEN MOVING AROUND A LOT MORE THAN USUAL: NOT AT ALL

## 2025-07-03 NOTE — PROGRESS NOTES
Chief Complaint   Patient presents with    Annual Exam     Patient is here today for Physical.    Pain     Patient states he hit his right hip on corner of table this morning and its causing him pain.       \"Have you been to the ER, urgent care clinic since your last visit?  Hospitalized since your last visit?\"    NO    “Have you seen or consulted any other health care providers outside of [facility] since your last visit?”    NO            Click Here for Release of Records Request       There were no vitals filed for this visit.   Health Maintenance Due   Topic Date Due    Varicella vaccine (1 of 2 - 13+ 2-dose series) Never done    Hepatitis B vaccine (2 of 3 - 3-dose series) 2004    Pneumococcal 0-49 years Vaccine (1 of 2 - PCV) Never done    COVID-19 Vaccine (2024- season) Never done    Depression Monitoring  2025        The patient, Isaac Hagan, identity was verified by name and .

## 2025-07-03 NOTE — PROGRESS NOTES
[facility]  [address] [place] [address] [phone]    C/C: Complete physical    Subjective:    Isaac Hagan is a 35 y.o. male who presents to clinic today for annual physical exam.      Here with Skill builder (Mrs. Aguilar) who is in the waiting room.    Acute/chronic Concerns include:   Bipolar Disorder:  Goes to [facility] and sees psych there, had appointment a month ago.  Pt states that he weaned himself off Trazodone and Seroquel. States that he had built skills to cope without medications.     General Health Habits:  Smoking Hx: no   Alcohol Use: no   Occupation: unemployed  Lives in a house with other mates sharing same common space. Has siblings.    Health Maintenance reviewed -     Health Maintenance Due   Topic Date Due    Varicella vaccine (1 of 2 - 13+ 2-dose series) Never done    Hepatitis B vaccine (2 of 3 - 3-dose series) 03/09/2004    Pneumococcal 0-49 years Vaccine (1 of 2 - PCV) Never done    COVID-19 Vaccine (1 - 2024-25 season) Never done    Depression Monitoring  07/03/2025       Review of Systems   A comprehensive review of systems was negative except for that written in the History of Present Illness.     Allergies- reviewed:   Allergies   Allergen Reactions    Haloperidol Other (See Comments)    Haloperidol Lactate Other (See Comments)       Medications- reviewed:   No current outpatient medications on file.     No current facility-administered medications for this visit.       Past Medical History- reviewed:  Past Medical History:   Diagnosis Date    Aggression 3/22/2013    Anxiety     Bipolar disorder, in partial remission, most recent episode depressed (HCC) 7/26/2017    Heart murmur 11/23/2015    History of traumatic brain injury 11/23/2015    PTSD (post-traumatic stress disorder)     states from fall as child    TBI (traumatic brain injury) (HCC)     age 9yo, fell down steps at home    Vitamin D deficiency 11/20/2017       Past

## 2025-07-04 LAB
HIV 1+2 AB+HIV1 P24 AG SERPL QL IA: NONREACTIVE
HIV 1/2 RESULT COMMENT: NORMAL
RPR SER QL: NONREACTIVE

## 2025-07-06 ENCOUNTER — RESULTS FOLLOW-UP (OUTPATIENT)
Age: 35
End: 2025-07-06

## 2025-07-06 LAB
C TRACH RRNA SPEC QL NAA+PROBE: NEGATIVE
N GONORRHOEA RRNA SPEC QL NAA+PROBE: NEGATIVE
SPECIMEN SOURCE: NORMAL